# Patient Record
Sex: FEMALE | Race: WHITE | NOT HISPANIC OR LATINO | URBAN - NONMETROPOLITAN AREA
[De-identification: names, ages, dates, MRNs, and addresses within clinical notes are randomized per-mention and may not be internally consistent; named-entity substitution may affect disease eponyms.]

---

## 2020-02-05 ENCOUNTER — OFFICE VISIT (OUTPATIENT)
Dept: CARDIOLOGY | Facility: CLINIC | Age: 75
End: 2020-02-05

## 2020-02-05 VITALS
DIASTOLIC BLOOD PRESSURE: 76 MMHG | WEIGHT: 234 LBS | HEART RATE: 49 BPM | HEIGHT: 66 IN | SYSTOLIC BLOOD PRESSURE: 148 MMHG | BODY MASS INDEX: 37.61 KG/M2

## 2020-02-05 DIAGNOSIS — R00.1 BRADYCARDIA, SINUS: ICD-10-CM

## 2020-02-05 DIAGNOSIS — R05.9 COUGH: ICD-10-CM

## 2020-02-05 DIAGNOSIS — J43.9 PULMONARY EMPHYSEMA, UNSPECIFIED EMPHYSEMA TYPE (HCC): ICD-10-CM

## 2020-02-05 DIAGNOSIS — I10 ESSENTIAL HYPERTENSION: ICD-10-CM

## 2020-02-05 DIAGNOSIS — R07.89 CHEST PAIN, ATYPICAL: ICD-10-CM

## 2020-02-05 DIAGNOSIS — E03.9 HYPOTHYROIDISM, UNSPECIFIED TYPE: ICD-10-CM

## 2020-02-05 DIAGNOSIS — E55.9 VITAMIN D DEFICIENCY: ICD-10-CM

## 2020-02-05 DIAGNOSIS — E83.42 HYPOMAGNESEMIA: ICD-10-CM

## 2020-02-05 DIAGNOSIS — R60.0 BILATERAL LEG EDEMA: ICD-10-CM

## 2020-02-05 DIAGNOSIS — R41.3 MEMORY LOSS: ICD-10-CM

## 2020-02-05 DIAGNOSIS — E88.81 METABOLIC SYNDROME: ICD-10-CM

## 2020-02-05 DIAGNOSIS — D75.89 MACROCYTOSIS WITHOUT ANEMIA: ICD-10-CM

## 2020-02-05 DIAGNOSIS — R06.02 SHORTNESS OF BREATH: Primary | ICD-10-CM

## 2020-02-05 DIAGNOSIS — E78.00 HYPERCHOLESTEREMIA: ICD-10-CM

## 2020-02-05 DIAGNOSIS — E11.9 TYPE 2 DIABETES MELLITUS WITHOUT COMPLICATION, WITHOUT LONG-TERM CURRENT USE OF INSULIN (HCC): ICD-10-CM

## 2020-02-05 PROBLEM — E88.810 METABOLIC SYNDROME: Status: ACTIVE | Noted: 2020-02-05

## 2020-02-05 PROCEDURE — 93000 ELECTROCARDIOGRAM COMPLETE: CPT | Performed by: INTERNAL MEDICINE

## 2020-02-05 PROCEDURE — 99204 OFFICE O/P NEW MOD 45 MIN: CPT | Performed by: INTERNAL MEDICINE

## 2020-02-05 RX ORDER — LISINOPRIL 30 MG/1
30 TABLET ORAL DAILY
COMMUNITY
End: 2020-02-19 | Stop reason: ALTCHOICE

## 2020-02-05 RX ORDER — ASPIRIN 81 MG/1
81 TABLET ORAL DAILY
COMMUNITY

## 2020-02-05 RX ORDER — SPIRONOLACTONE 25 MG/1
25 TABLET ORAL DAILY
Qty: 30 TABLET | Refills: 11 | Status: SHIPPED | OUTPATIENT
Start: 2020-02-05 | End: 2022-08-23 | Stop reason: ALTCHOICE

## 2020-02-05 RX ORDER — QUETIAPINE FUMARATE 25 MG/1
25 TABLET, FILM COATED ORAL NIGHTLY
COMMUNITY

## 2020-02-05 RX ORDER — POTASSIUM CHLORIDE 750 MG/1
10 TABLET, FILM COATED, EXTENDED RELEASE ORAL DAILY
COMMUNITY
End: 2022-02-01

## 2020-02-05 RX ORDER — LACTOBACILLUS RHAMNOSUS GG 10B CELL
CAPSULE ORAL DAILY
COMMUNITY

## 2020-02-05 RX ORDER — METOPROLOL TARTRATE 50 MG/1
50 TABLET, FILM COATED ORAL
Status: SHIPPED | COMMUNITY
Start: 2020-02-05 | End: 2020-08-10 | Stop reason: ALTCHOICE

## 2020-02-05 RX ORDER — METOPROLOL TARTRATE 50 MG/1
50 TABLET, FILM COATED ORAL 2 TIMES DAILY
COMMUNITY
End: 2020-02-05

## 2020-02-05 RX ORDER — SIMVASTATIN 10 MG
10 TABLET ORAL NIGHTLY
COMMUNITY

## 2020-02-05 RX ORDER — FUROSEMIDE 20 MG/1
20 TABLET ORAL 2 TIMES DAILY
COMMUNITY
End: 2022-05-24 | Stop reason: SDUPTHER

## 2020-02-05 RX ORDER — FLUTICASONE PROPIONATE 50 MCG
2 SPRAY, SUSPENSION (ML) NASAL DAILY
COMMUNITY

## 2020-02-05 RX ORDER — ALBUTEROL SULFATE 2.5 MG/3ML
2.5 SOLUTION RESPIRATORY (INHALATION) 3 TIMES DAILY
COMMUNITY

## 2020-02-05 RX ORDER — ESCITALOPRAM OXALATE 20 MG/1
20 TABLET ORAL DAILY
COMMUNITY

## 2020-02-05 RX ORDER — SACCHAROMYCES BOULARDII 250 MG
250 CAPSULE ORAL 2 TIMES DAILY
COMMUNITY
End: 2022-05-04 | Stop reason: SDUPTHER

## 2020-02-05 RX ORDER — LEVOTHYROXINE SODIUM 0.07 MG/1
75 TABLET ORAL DAILY
COMMUNITY

## 2020-02-05 RX ORDER — CETIRIZINE HYDROCHLORIDE 10 MG/1
10 TABLET ORAL DAILY
COMMUNITY
End: 2022-02-01

## 2020-02-05 NOTE — PROGRESS NOTES
Chief Complaint   Patient presents with   • Establish Care     CHF   • Congestive Heart Failure     diagnosed at a recent visit to ER 1/30/20, went with edema in lower extremities, extremely SOB, abdomen distended.  Increased Lasix, stopped amlodipine, started neb txs. Followed up with Nate yesterday.  Symptoms started about a month ago, progressively gotten worse last week.    • Shortness of Breath     O2 dependent, Dr Aguilar follows her COPD, to see him Feb 17th.    • Labs     most recent labs are in chart, ER labs will be in door   • Cardiac history     thinks she had stress and echo a few years ago at Dr Lee's office, requesting records.         CARDIAC COMPLAINTS  chest pressure/discomfort, dyspnea, Dizziness, fatigue and lower extremity edema      Subjective   Lorena Wolf is a 74 y.o. female came in today for her initial cardiac evaluation.  She has history of hypertension, diabetes, hypothyroidism and hypercholesterolemia.  Her diabetes is being managed with diet alone.  She also has significant COPD and emphysema and is followed by Dr. Aguilar  She was seen in the emergency room about a week ago with increasing shortness of breath and bilateral leg edema with abdominal distention for about 2 to 3 weeks.  Apparently the hospital was full and they were not able to admit her so they gave her Lasix after which she started feeling little better she did undergo few investigation at the hospital which includes an x-ray chest which shows cardiomegaly.  Her lab work showed normal renal function, mildly elevated BNP, normal blood count with mild macrocytosis.  She was advised to go off the amlodipine and increase the dose of Lasix.  After this her leg edema did get little better she now has from her knee down.  She still has some shortness of breath.  She is not sure how much weight she lost.  She does have some chest pain in the form of sharp pain in the middle part of the chest lasting for few seconds to few  minutes and subsides on its own.  She also started noticing that her oxygen seems to be dropping a little bit and she is using it constantly.  She is due to see her pulmonologist in the next 2 weeks.  She apparently had an echocardiogram and a stress test done few years ago at another cardiologist office.  She is not sure why it was done.  She used to be a smoker who quit in 2003.  She has no exposure to secondhand smoking.  She does have a strong family history of premature coronary artery disease.    History reviewed. No pertinent surgical history.    Current Outpatient Medications   Medication Sig Dispense Refill   • albuterol (PROVENTIL) (2.5 MG/3ML) 0.083% nebulizer solution Take 2.5 mg by nebulization Every 4 (Four) Hours As Needed for Wheezing.     • aspirin 81 MG EC tablet Take 81 mg by mouth Daily.     • cetirizine (zyrTEC) 10 MG tablet Take 10 mg by mouth Daily.     • escitalopram (LEXAPRO) 20 MG tablet Take 20 mg by mouth Daily.     • fluticasone (FLONASE) 50 MCG/ACT nasal spray 2 sprays into the nostril(s) as directed by provider Daily.     • furosemide (LASIX) 20 MG tablet Take 20 mg by mouth 2 (Two) Times a Day.     • levothyroxine (SYNTHROID, LEVOTHROID) 75 MCG tablet Take 75 mcg by mouth Daily.     • lisinopril (PRINIVIL,ZESTRIL) 30 MG tablet Take 30 mg by mouth Daily.     • metoprolol tartrate (LOPRESSOR) 50 MG tablet Take 1 tablet by mouth. Reduce it to 1/2 Tab twice a day     • O2 (OXYGEN) Inhale 3 L/min Continuous.     • potassium chloride (K-DUR) 10 MEQ CR tablet Take 10 mEq by mouth Daily.     • probiotic (CULTURELLE) capsule capsule Take  by mouth Daily.     • QUEtiapine (SEROquel) 25 MG tablet Take 25 mg by mouth Every Night.     • saccharomyces boulardii (FLORASTOR) 250 MG capsule Take 250 mg by mouth 2 (Two) Times a Day.     • simvastatin (ZOCOR) 10 MG tablet Take 10 mg by mouth Every Night.     • spironolactone (ALDACTONE) 25 MG tablet Take 1 tablet by mouth Daily. 30 tablet 11     No  current facility-administered medications for this visit.            ALLERGIES:  Patient has no known allergies.    Past Medical History:   Diagnosis Date   • Chronic kidney disease     PCP reports as stage III   • Colon cancer (CMS/[facility])     s/p resection   • Constipation    • COPD (chronic obstructive pulmonary disease) (CMS/[facility])    • Depression    • Diabetes mellitus (CMS/[facility])    • GERD (gastroesophageal reflux disease)    • History of appendectomy    • History of bladder surgery    • History of eye surgery     bilateral   • History of foot surgery     bilateral   • History of hernia repair    • History of hysterectomy    • History of surgery on arm     left   • History of tubal ligation    • Hyperlipidemia    • Hypertension    • Hypothyroidism    • Osteoarthritis        Social History     Tobacco Use   Smoking Status Former Smoker   • Years: 40.00   • Types: Cigarettes   • Last attempt to quit:    • Years since quittin.1   Smokeless Tobacco Never Used          Family History   Problem Relation Age of Onset   • Cancer Mother    • Stroke Mother    • COPD Father    • Heart disease Sister    • Hypertension Sister    • Hyperlipidemia Sister    • Diabetes Sister    • Cancer Sister    • COPD Brother    • Other Sister         history unknown   • Other Brother          as a child   • Heart disease Son    • Hypertension Son    • Hyperlipidemia Son    • Hyperlipidemia Daughter    • Hypertension Daughter    • Hyperlipidemia Daughter    • No Known Problems Daughter    • No Known Problems Daughter    • No Known Problems Daughter        Review of Systems   Constitution: Positive for malaise/fatigue and weight gain. Negative for decreased appetite.   HENT: Negative for congestion and sore throat.    Eyes: Negative for blurred vision.   Cardiovascular: Positive for chest pain, dyspnea on exertion and leg swelling.   Respiratory: Positive for shortness of breath. Negative for snoring.    Endocrine: Negative for cold  "intolerance and heat intolerance.   Hematologic/Lymphatic: Negative for adenopathy. Does not bruise/bleed easily.   Skin: Negative for itching, nail changes and skin cancer.   Musculoskeletal: Negative for arthritis and myalgias.   Gastrointestinal: Negative for abdominal pain, dysphagia and heartburn.   Genitourinary: Negative for bladder incontinence and frequency.   Neurological: Positive for dizziness and light-headedness. Negative for seizures and vertigo.   Psychiatric/Behavioral: Positive for memory loss. Negative for altered mental status.   Allergic/Immunologic: Negative for environmental allergies and hives.       Diabetes- Yes  Thyroid- abnormal    Objective     /76 (BP Location: Right arm)   Pulse (!) 49   Ht 166.4 cm (65.5\")   Wt 106 kg (234 lb)   BMI 38.35 kg/m²     Physical Exam   Constitutional: She is oriented to person, place, and time. She appears well-developed and well-nourished.   HENT:   Head: Normocephalic.   Nose: Nose normal.   Eyes: Pupils are equal, round, and reactive to light. EOM are normal.   Neck: Normal range of motion.   Cardiovascular: Normal rate, regular rhythm, S1 normal and S2 normal.   Murmur heard.  Pulmonary/Chest: Effort normal and breath sounds normal.   Abdominal: Soft. Bowel sounds are normal.   Musculoskeletal: Normal range of motion. She exhibits edema.   Neurological: She is alert and oriented to person, place, and time.   Skin: Skin is warm and dry.   Psychiatric: She has a normal mood and affect.         ECG 12 Lead  Date/Time: 2/5/2020 11:05 AM  Performed by: Luis Casas MD  Authorized by: Luis Casas MD   Comparison: compared with previous ECG from 1/30/2020  Similar to previous ECG  Rhythm: sinus bradycardia  Rate: bradycardic  QRS axis: normal  Other findings: low voltage    Clinical impression: non-specific ECG              Assessment/Plan   Patient's Body mass index is 38.35 kg/m². BMI is above normal parameters. Recommendations " include: educational material, exercise counseling and nutrition counseling.     Lorena was seen today for establish care, congestive heart failure, shortness of breath, labs and cardiac history.    Diagnoses and all orders for this visit:    Shortness of breath  -     spironolactone (ALDACTONE) 25 MG tablet; Take 1 tablet by mouth Daily.  -     Adult Transthoracic Echo Complete W/ Cont if Necessary Per Protocol; Future  -     BNP; Future    Bilateral leg edema  -     D-dimer, Quantitative; Future  -     BNP; Future  -     US Venous Doppler Lower Extremity Left (duplex for insufficiency); Future    Hypothyroidism, unspecified type  -     TSH; Future    Metabolic syndrome    Chest pain, atypical  -     Stress Test With Myocardial Perfusion One Day; Future  -     High Sensitivity CRP; Future    Cough  -     Adult Transthoracic Echo Complete W/ Cont if Necessary Per Protocol; Future    Essential hypertension  -     spironolactone (ALDACTONE) 25 MG tablet; Take 1 tablet by mouth Daily.  -     Stress Test With Myocardial Perfusion One Day; Future  -     Comprehensive Metabolic Panel; Future    Type 2 diabetes mellitus without complication, without long-term current use of insulin (CMS/HCC)  -     Stress Test With Myocardial Perfusion One Day; Future  -     Hemoglobin A1c; Future    Pulmonary emphysema, unspecified emphysema type (CMS/HCC)    Hypomagnesemia  -     Magnesium; Future    Hypercholesteremia  -     Lipid Panel; Future    Bradycardia, sinus    Macrocytosis without anemia  -     Vitamin B12; Future  -     Folate; Future    Memory loss  -     Vitamin B12; Future  -     Folate; Future    Vitamin D deficiency  -     Vitamin D 25 Hydroxy; Future    At baseline she is bradycardic.  Her blood pressure is slightly elevated.  Her EKG shows sinus bradycardia, nonspecific ST changes.  Her QRS is around 97 ms.  Her clinical examination reveals a BMI of close to 38.  She does have soft heart sounds and short systolic murmur  at the mitral area.  Her lungs show slightly diminished air entry.  No rhonchi heard.  She does have about 2+ pedal edema and her peripheral pulses slightly weak.    Her major symptom seems to be the shortness of breath and pedal edema and has improved slightly with Lasix.  At this time I advised her to continue the same dose.  I do like to get an echocardiogram done to evaluate LV function, valvular structures as well as evaluate the PA pressure and rule out pericardial effusion.  She is also advised to undergo some lab work which includes a BNP, CMP and d-dimer.  She is also advised to have a venous Doppler ultrasound done to rule out any venous insufficiency.  At this time I advised her to start using support stockings.    Regarding her chest pain, it appears to be atypical but I cannot rule out cardiac especially with all the risk factors she has.  I advised her to undergo a stress test in the form of dobutamine Myoview to evaluate for any stress-induced arrhythmia, stress induced ischemia and also to evaluate her chronotropic response.   I also like to get her CRP level checked.    Regarding her blood sugar for which she has not been taking any medication, like to repeat the A1c.  If it is elevated she may need further work-up and also medication.    Regarding her hypercholesterolemia, continue the Zocor.  I like to recheck her cholesterol and also the LFT.    Regarding her macrocytosis and now she is having some memory problem, like to check her B12 and folic acid level    Regarding her bradycardia, I reduced the dose of Lopressor to 25 mg twice a day.    Regarding her blood pressure, I did add Aldactone 25 mg once a day and will check the labs to evaluate her potassium level    Regarding her metabolic syndrome, I had a long talk with her about diet exercise and weight reduction.    Based on the results of the test and her response to the medication, further recommendations will be made.                Electronically signed by Luis Casas MD February 5, 2020 11:02 AM

## 2020-02-05 NOTE — PATIENT INSTRUCTIONS
Mediterranean Diet  A Mediterranean diet refers to food and lifestyle choices that are based on the traditions of countries located on the [place]. This way of eating has been shown to help prevent certain conditions and improve outcomes for people who have chronic diseases, like kidney disease and heart disease.  What are tips for following this plan?  Lifestyle  · Cook and eat meals together with your family, when possible.  · Drink enough fluid to keep your urine clear or pale yellow.  · Be physically active every day. This includes:  ? Aerobic exercise like running or swimming.  ? Leisure activities like gardening, walking, or housework.  · Get 7-8 hours of sleep each night.  · If recommended by your health care provider, drink red wine in moderation. This means 1 glass a day for nonpregnant women and 2 glasses a day for men. A glass of wine equals 5 oz (150 mL).  Reading food labels    · Check the serving size of packaged foods. For foods such as rice and pasta, the serving size refers to the amount of cooked product, not dry.  · Check the total fat in packaged foods. Avoid foods that have saturated fat or trans fats.  · Check the ingredients list for added sugars, such as corn syrup.  Shopping  · At the grocery store, buy most of your food from the areas near the walls of the store. This includes:  ? Fresh fruits and vegetables (produce).  ? Grains, beans, nuts, and seeds. Some of these may be available in unpackaged forms or large amounts (in bulk).  ? Fresh seafood.  ? Poultry and eggs.  ? Low-fat dairy products.  · Buy whole ingredients instead of prepackaged foods.  · Buy fresh fruits and vegetables in-season from local farmers markets.  · Buy frozen fruits and vegetables in resealable bags.  · If you do not have access to quality fresh seafood, buy precooked frozen shrimp or canned fish, such as tuna, salmon, or sardines.  · Buy small amounts of raw or cooked vegetables, salads, or olives from  the deli or salad bar at your store.  · Stock your pantry so you always have certain foods on hand, such as olive oil, canned tuna, canned tomatoes, rice, pasta, and beans.  Cooking  · Cook foods with extra-virgin olive oil instead of using butter or other vegetable oils.  · Have meat as a side dish, and have vegetables or grains as your main dish. This means having meat in small portions or adding small amounts of meat to foods like pasta or stew.  · Use beans or vegetables instead of meat in common dishes like chili or lasagna.  · [place]ino with different cooking methods. Try roasting or broiling vegetables instead of steaming or sautéeing them.  · Add frozen vegetables to soups, stews, pasta, or rice.  · Add nuts or seeds for added healthy fat at each meal. You can add these to yogurt, salads, or vegetable dishes.  · Marinate fish or vegetables using olive oil, lemon juice, garlic, and fresh herbs.  Meal planning    · Plan to eat 1 vegetarian meal one day each week. Try to work up to 2 vegetarian meals, if possible.  · Eat seafood 2 or more times a week.  · Have healthy snacks readily available, such as:  ? Vegetable sticks with hummus.  ? Greek yogurt.  ? Fruit and nut trail mix.  · Eat balanced meals throughout the week. This includes:  ? Fruit: 2-3 servings a day  ? Vegetables: 4-5 servings a day  ? Low-fat dairy: 2 servings a day  ? Fish, poultry, or lean meat: 1 serving a day  ? Beans and legumes: 2 or more servings a week  ? Nuts and seeds: 1-2 servings a day  ? Whole grains: 6-8 servings a day  ? Extra-virgin olive oil: 3-4 servings a day  · Limit red meat and sweets to only a few servings a month  What are my food choices?  · Mediterranean diet  ? Recommended  ? Grains: Whole-grain pasta. Brown rice. Bulgar wheat. Polenta. Couscous. Whole-wheat bread. Oatmeal. Quinoa.  ? Vegetables: Artichokes. Beets. Broccoli. Cabbage. Carrots. Eggplant. Green beans. Chard. Kale. Spinach. Onions. Leeks. Peas. Squash.  Tomatoes. Peppers. Radishes.  ? Fruits: Apples. Apricots. Avocado. Berries. Bananas. Cherries. Dates. Figs. Grapes. Juan F. Melon. Oranges. Peaches. Plums. Pomegranate.  ? Meats and other protein foods: Beans. Almonds. Sunflower seeds. Pine nuts. Peanuts. Cod. [place]. Scallops. Shrimp. Tuna. Tilapia. Clams. Oysters. Eggs.  ? Dairy: Low-fat milk. Cheese. Greek yogurt.  ? Beverages: Water. Red wine. Herbal tea.  ? Fats and oils: Extra virgin olive oil. Avocado oil. Grape seed oil.  ? Sweets and desserts: Greek yogurt with honey. Baked apples. Poached pears. Trail mix.  ? Seasoning and other foods: Basil. Cilantro. Coriander. Cumin. Mint. Parsley. Sp. Rosemary. Tarragon. Garlic. Oregano. Thyme. Pepper. Balsalmic vinegar. Tahini. Hummus. Tomato sauce. Olives. Mushrooms.  ? Limit these  ? Grains: Prepackaged pasta or rice dishes. Prepackaged cereal with added sugar.  ? Vegetables: Deep fried potatoes (french fries).  ? Fruits: Fruit canned in syrup.  ? Meats and other protein foods: Beef. Pork. Lamb. Poultry with skin. Hot dogs. [place].  ? Dairy: Ice cream. Sour cream. Whole milk.  ? Beverages: Juice. Sugar-sweetened soft drinks. Beer. Liquor and spirits.  ? Fats and oils: Butter. Canola oil. Vegetable oil. Beef fat (tallow). Lard.  ? Sweets and desserts: Cookies. Cakes. Pies. Candy.  ? Seasoning and other foods: Mayonnaise. Premade sauces and marinades.  ? The items listed may not be a complete list. Talk with your dietitian about what dietary choices are right for you.  Summary  · The Mediterranean diet includes both food and lifestyle choices.  · Eat a variety of fresh fruits and vegetables, beans, nuts, seeds, and whole grains.  · Limit the amount of red meat and sweets that you eat.  · Talk with your health care provider about whether it is safe for you to drink red wine in moderation. This means 1 glass a day for nonpregnant women and 2 glasses a day for men. A glass of wine equals 5 oz (150 mL).  This information  is not intended to replace advice given to you by your health care provider. Make sure you discuss any questions you have with your health care provider.  Document Released: 08/10/2017 Document Revised: 09/12/2017 Document Reviewed: 08/10/2017  [employer] Interactive Patient Education © 2019 [employer]

## 2020-02-12 NOTE — PROGRESS NOTES
Pt aware of results and recommendations to try wearing stockings for 3 months and if no help to call back for a possible referral to Dr Mcleod.

## 2020-02-18 ENCOUNTER — HOSPITAL ENCOUNTER (OUTPATIENT)
Dept: CARDIOLOGY | Facility: HOSPITAL | Age: 75
Discharge: HOME | End: 2020-02-18

## 2020-02-18 ENCOUNTER — LAB (OUTPATIENT)
Dept: LAB | Facility: HOSPITAL | Age: 75
End: 2020-02-18

## 2020-02-18 VITALS — HEIGHT: 65 IN | BODY MASS INDEX: 38.93 KG/M2 | WEIGHT: 233.69 LBS

## 2020-02-18 DIAGNOSIS — E78.00 HYPERCHOLESTEREMIA: ICD-10-CM

## 2020-02-18 DIAGNOSIS — I10 ESSENTIAL HYPERTENSION: ICD-10-CM

## 2020-02-18 DIAGNOSIS — R60.0 BILATERAL LEG EDEMA: ICD-10-CM

## 2020-02-18 DIAGNOSIS — R06.02 SHORTNESS OF BREATH: ICD-10-CM

## 2020-02-18 DIAGNOSIS — E55.9 VITAMIN D DEFICIENCY: ICD-10-CM

## 2020-02-18 DIAGNOSIS — E83.42 HYPOMAGNESEMIA: ICD-10-CM

## 2020-02-18 DIAGNOSIS — D75.89 MACROCYTOSIS WITHOUT ANEMIA: ICD-10-CM

## 2020-02-18 DIAGNOSIS — R07.89 CHEST PAIN, ATYPICAL: ICD-10-CM

## 2020-02-18 DIAGNOSIS — R05.9 COUGH: ICD-10-CM

## 2020-02-18 DIAGNOSIS — E11.9 TYPE 2 DIABETES MELLITUS WITHOUT COMPLICATION, WITHOUT LONG-TERM CURRENT USE OF INSULIN (HCC): ICD-10-CM

## 2020-02-18 DIAGNOSIS — R41.3 MEMORY LOSS: ICD-10-CM

## 2020-02-18 DIAGNOSIS — E03.9 HYPOTHYROIDISM, UNSPECIFIED TYPE: ICD-10-CM

## 2020-02-18 LAB
ALBUMIN SERPL-MCNC: 4.08 G/DL (ref 3.5–5.2)
ALBUMIN/GLOB SERPL: 1 G/DL
ALP SERPL-CCNC: 84 U/L (ref 39–117)
ALT SERPL W P-5'-P-CCNC: 18 U/L (ref 1–33)
ANION GAP SERPL CALCULATED.3IONS-SCNC: 15 MMOL/L (ref 5–15)
AORTIC DIMENSIONLESS INDEX: 0.8 (DI)
AST SERPL-CCNC: 22 U/L (ref 1–32)
BH CV ECHO MEAS - AO MAX PG (FULL): 2.6 MMHG
BH CV ECHO MEAS - AO MAX PG: 6.6 MMHG
BH CV ECHO MEAS - AO MEAN PG (FULL): 1 MMHG
BH CV ECHO MEAS - AO MEAN PG: 3 MMHG
BH CV ECHO MEAS - AO ROOT AREA (BSA CORRECTED): 1.5
BH CV ECHO MEAS - AO ROOT AREA: 8 CM^2
BH CV ECHO MEAS - AO ROOT DIAM: 3.2 CM
BH CV ECHO MEAS - AO V2 MAX: 128 CM/SEC
BH CV ECHO MEAS - AO V2 MEAN: 81.8 CM/SEC
BH CV ECHO MEAS - AO V2 VTI: 33 CM
BH CV ECHO MEAS - AVA(I,A): 2 CM^2
BH CV ECHO MEAS - AVA(I,D): 2 CM^2
BH CV ECHO MEAS - AVA(V,A): 2.2 CM^2
BH CV ECHO MEAS - AVA(V,D): 2.2 CM^2
BH CV ECHO MEAS - BSA(HAYCOCK): 2.3 M^2
BH CV ECHO MEAS - BSA: 2.1 M^2
BH CV ECHO MEAS - BZI_BMI: 37.8 KILOGRAMS/M^2
BH CV ECHO MEAS - BZI_METRIC_HEIGHT: 167.6 CM
BH CV ECHO MEAS - BZI_METRIC_WEIGHT: 106.1 KG
BH CV ECHO MEAS - EDV(CUBED): 103.2 ML
BH CV ECHO MEAS - EDV(TEICH): 101.9 ML
BH CV ECHO MEAS - EF(CUBED): 72.2 %
BH CV ECHO MEAS - EF(TEICH): 63.9 %
BH CV ECHO MEAS - ESV(CUBED): 28.7 ML
BH CV ECHO MEAS - ESV(TEICH): 36.7 ML
BH CV ECHO MEAS - FS: 34.8 %
BH CV ECHO MEAS - IVS/LVPW: 1.1
BH CV ECHO MEAS - IVSD: 1.2 CM
BH CV ECHO MEAS - LA DIMENSION(2D): 3.9 CM
BH CV ECHO MEAS - LA DIMENSION: 4.1 CM
BH CV ECHO MEAS - LA/AO: 1.3
BH CV ECHO MEAS - LAT PEAK E' VEL: 7.1 CM/SEC
BH CV ECHO MEAS - LV IVRT: 0.07 SEC
BH CV ECHO MEAS - LV MASS(C)D: 206.3 GRAMS
BH CV ECHO MEAS - LV MASS(C)DI: 96.5 GRAMS/M^2
BH CV ECHO MEAS - LV MAX PG: 3.9 MMHG
BH CV ECHO MEAS - LV MEAN PG: 2 MMHG
BH CV ECHO MEAS - LV V1 MAX: 99.2 CM/SEC
BH CV ECHO MEAS - LV V1 MEAN: 55.4 CM/SEC
BH CV ECHO MEAS - LV V1 VTI: 22.7 CM
BH CV ECHO MEAS - LVIDD: 4.7 CM
BH CV ECHO MEAS - LVIDS: 3.1 CM
BH CV ECHO MEAS - LVOT AREA (M): 2.8 CM^2
BH CV ECHO MEAS - LVOT AREA: 2.8 CM^2
BH CV ECHO MEAS - LVOT DIAM: 1.9 CM
BH CV ECHO MEAS - LVPWD: 1.1 CM
BH CV ECHO MEAS - MED PEAK E' VEL: 8.5 CM/SEC
BH CV ECHO MEAS - MR MAX PG: 26.2 MMHG
BH CV ECHO MEAS - MR MAX VEL: 256 CM/SEC
BH CV ECHO MEAS - MV A MAX VEL: 101 CM/SEC
BH CV ECHO MEAS - MV DEC SLOPE: 430 CM/SEC^2
BH CV ECHO MEAS - MV DEC TIME: 0.2 SEC
BH CV ECHO MEAS - MV E MAX VEL: 93.3 CM/SEC
BH CV ECHO MEAS - MV E/A: 0.92
BH CV ECHO MEAS - MV MAX PG: 4.2 MMHG
BH CV ECHO MEAS - MV MEAN PG: 1 MMHG
BH CV ECHO MEAS - MV P1/2T MAX VEL: 105 CM/SEC
BH CV ECHO MEAS - MV P1/2T: 71.5 MSEC
BH CV ECHO MEAS - MV V2 MAX: 103 CM/SEC
BH CV ECHO MEAS - MV V2 MEAN: 50.9 CM/SEC
BH CV ECHO MEAS - MV V2 VTI: 29.1 CM
BH CV ECHO MEAS - MVA P1/2T LCG: 2.1 CM^2
BH CV ECHO MEAS - MVA(P1/2T): 3.1 CM^2
BH CV ECHO MEAS - MVA(VTI): 2.2 CM^2
BH CV ECHO MEAS - PA MAX PG (FULL): 1.8 MMHG
BH CV ECHO MEAS - PA MAX PG: 3.4 MMHG
BH CV ECHO MEAS - PA MEAN PG (FULL): 1 MMHG
BH CV ECHO MEAS - PA MEAN PG: 2 MMHG
BH CV ECHO MEAS - PA V2 MAX: 91.8 CM/SEC
BH CV ECHO MEAS - PA V2 MEAN: 60.3 CM/SEC
BH CV ECHO MEAS - PA V2 VTI: 22.7 CM
BH CV ECHO MEAS - RAP SYSTOLE: 10 MMHG
BH CV ECHO MEAS - RV MAX PG: 1.6 MMHG
BH CV ECHO MEAS - RV MEAN PG: 1 MMHG
BH CV ECHO MEAS - RV V1 MAX: 63.3 CM/SEC
BH CV ECHO MEAS - RV V1 MEAN: 40.3 CM/SEC
BH CV ECHO MEAS - RV V1 VTI: 14.8 CM
BH CV ECHO MEAS - RVDD: 2.8 CM
BH CV ECHO MEAS - RVSP: 29 MMHG
BH CV ECHO MEAS - SI(AO): 124.1 ML/M^2
BH CV ECHO MEAS - SI(CUBED): 34.8 ML/M^2
BH CV ECHO MEAS - SI(LVOT): 30.1 ML/M^2
BH CV ECHO MEAS - SI(TEICH): 30.5 ML/M^2
BH CV ECHO MEAS - SV(AO): 265.4 ML
BH CV ECHO MEAS - SV(CUBED): 74.5 ML
BH CV ECHO MEAS - SV(LVOT): 64.4 ML
BH CV ECHO MEAS - SV(TEICH): 65.1 ML
BH CV ECHO MEAS - TR MAX VEL: 215 CM/SEC
BH CV ECHO MEASUREMENTS AVERAGE E/E' RATIO: 11.96
BH CV NUCLEAR PRIOR STUDY: 3
BH CV STRESS BP STAGE 1: NORMAL
BH CV STRESS BP STAGE 2: NORMAL
BH CV STRESS BP STAGE 3: NORMAL
BH CV STRESS DOSE DOBUTAMINE STAGE 1: 10
BH CV STRESS DOSE DOBUTAMINE STAGE 2: 20
BH CV STRESS DOSE DOBUTAMINE STAGE 3: 30
BH CV STRESS DURATION MIN STAGE 1: 2
BH CV STRESS DURATION MIN STAGE 2: 2
BH CV STRESS DURATION MIN STAGE 3: 1
BH CV STRESS DURATION SEC STAGE 1: 0
BH CV STRESS DURATION SEC STAGE 2: 0
BH CV STRESS DURATION SEC STAGE 3: 58
BH CV STRESS HR STAGE 1: 109
BH CV STRESS HR STAGE 2: 112
BH CV STRESS HR STAGE 3: 123
BH CV STRESS PROTOCOL 1: NORMAL
BH CV STRESS RECOVERY BP: NORMAL MMHG
BH CV STRESS RECOVERY HR: 81 BPM
BH CV STRESS STAGE 1: 1
BH CV STRESS STAGE 2: 2
BH CV STRESS STAGE 3: 3
BILIRUB SERPL-MCNC: 0.4 MG/DL (ref 0.2–1.2)
BUN BLD-MCNC: 37 MG/DL (ref 8–23)
BUN/CREAT SERPL: 30.8 (ref 7–25)
CALCIUM SPEC-SCNC: 10 MG/DL (ref 8.6–10.5)
CHLORIDE SERPL-SCNC: 97 MMOL/L (ref 98–107)
CHOLEST SERPL-MCNC: 182 MG/DL (ref 0–200)
CO2 SERPL-SCNC: 26 MMOL/L (ref 22–29)
CREAT BLD-MCNC: 1.2 MG/DL (ref 0.57–1)
D DIMER PPP FEU-MCNC: 1.79 MCGFEU/ML (ref 0–0.5)
GFR SERPL CREATININE-BSD FRML MDRD: 44 ML/MIN/1.73
GLOBULIN UR ELPH-MCNC: 4.2 GM/DL
GLUCOSE BLD-MCNC: 110 MG/DL (ref 65–99)
HBA1C MFR BLD: 6.6 % (ref 4.8–5.6)
HDLC SERPL-MCNC: 54 MG/DL (ref 40–60)
LDLC SERPL CALC-MCNC: 85 MG/DL (ref 0–100)
LDLC/HDLC SERPL: 1.57 {RATIO}
LV EF NUC BP: 67 %
MAGNESIUM SERPL-MCNC: 2.2 MG/DL (ref 1.6–2.4)
MAXIMAL PREDICTED HEART RATE: 144 BPM
MAXIMAL PREDICTED HEART RATE: 144 BPM
NT-PROBNP SERPL-MCNC: 281.2 PG/ML (ref 5–1800)
PERCENT MAX PREDICTED HR: 85.42 %
POTASSIUM BLD-SCNC: 5.5 MMOL/L (ref 3.5–5.2)
PROT SERPL-MCNC: 8.3 G/DL (ref 6–8.5)
SODIUM BLD-SCNC: 138 MMOL/L (ref 136–145)
STRESS BASELINE BP: NORMAL MMHG
STRESS BASELINE HR: 58 BPM
STRESS PERCENT HR: 100 %
STRESS POST EXERCISE DUR MIN: 5 MIN
STRESS POST EXERCISE DUR SEC: 58 SEC
STRESS POST PEAK BP: NORMAL MMHG
STRESS POST PEAK HR: 123 BPM
STRESS TARGET HR: 122 BPM
STRESS TARGET HR: 122 BPM
TRIGL SERPL-MCNC: 217 MG/DL (ref 0–150)
TSH SERPL DL<=0.05 MIU/L-ACNC: 2.87 UIU/ML (ref 0.27–4.2)
VLDLC SERPL-MCNC: 43.4 MG/DL

## 2020-02-18 PROCEDURE — 93018 CV STRESS TEST I&R ONLY: CPT | Performed by: INTERNAL MEDICINE

## 2020-02-18 PROCEDURE — 83036 HEMOGLOBIN GLYCOSYLATED A1C: CPT | Performed by: INTERNAL MEDICINE

## 2020-02-18 PROCEDURE — A9500 TC99M SESTAMIBI: HCPCS | Performed by: INTERNAL MEDICINE

## 2020-02-18 PROCEDURE — 80053 COMPREHEN METABOLIC PANEL: CPT | Performed by: INTERNAL MEDICINE

## 2020-02-18 PROCEDURE — 78452 HT MUSCLE IMAGE SPECT MULT: CPT

## 2020-02-18 PROCEDURE — 93017 CV STRESS TEST TRACING ONLY: CPT

## 2020-02-18 PROCEDURE — 78452 HT MUSCLE IMAGE SPECT MULT: CPT | Performed by: INTERNAL MEDICINE

## 2020-02-18 PROCEDURE — 83735 ASSAY OF MAGNESIUM: CPT | Performed by: INTERNAL MEDICINE

## 2020-02-18 PROCEDURE — 82607 VITAMIN B-12: CPT | Performed by: INTERNAL MEDICINE

## 2020-02-18 PROCEDURE — 93306 TTE W/DOPPLER COMPLETE: CPT | Performed by: INTERNAL MEDICINE

## 2020-02-18 PROCEDURE — 83880 ASSAY OF NATRIURETIC PEPTIDE: CPT | Performed by: INTERNAL MEDICINE

## 2020-02-18 PROCEDURE — 93016 CV STRESS TEST SUPVJ ONLY: CPT | Performed by: NURSE PRACTITIONER

## 2020-02-18 PROCEDURE — 80061 LIPID PANEL: CPT | Performed by: INTERNAL MEDICINE

## 2020-02-18 PROCEDURE — 93306 TTE W/DOPPLER COMPLETE: CPT

## 2020-02-18 PROCEDURE — 85379 FIBRIN DEGRADATION QUANT: CPT | Performed by: INTERNAL MEDICINE

## 2020-02-18 PROCEDURE — 82746 ASSAY OF FOLIC ACID SERUM: CPT | Performed by: INTERNAL MEDICINE

## 2020-02-18 PROCEDURE — 25010000002 DOBUTAMINE PER 250 MG: Performed by: INTERNAL MEDICINE

## 2020-02-18 PROCEDURE — 0 TECHNETIUM SESTAMIBI: Performed by: INTERNAL MEDICINE

## 2020-02-18 PROCEDURE — 36415 COLL VENOUS BLD VENIPUNCTURE: CPT

## 2020-02-18 PROCEDURE — 82306 VITAMIN D 25 HYDROXY: CPT | Performed by: INTERNAL MEDICINE

## 2020-02-18 PROCEDURE — 86141 C-REACTIVE PROTEIN HS: CPT | Performed by: INTERNAL MEDICINE

## 2020-02-18 PROCEDURE — 84443 ASSAY THYROID STIM HORMONE: CPT | Performed by: INTERNAL MEDICINE

## 2020-02-18 RX ORDER — DOBUTAMINE HYDROCHLORIDE 200 MG/100ML
10 INJECTION INTRAVENOUS
Status: COMPLETED | OUTPATIENT
Start: 2020-02-18 | End: 2020-02-18

## 2020-02-18 RX ADMIN — TECHNETIUM TC 99M SESTAMIBI 1 DOSE: 1 INJECTION INTRAVENOUS at 09:45

## 2020-02-18 RX ADMIN — TECHNETIUM TC 99M SESTAMIBI 1 DOSE: 1 INJECTION INTRAVENOUS at 11:35

## 2020-02-18 RX ADMIN — DOBUTAMINE HYDROCHLORIDE 10 MCG/KG/MIN: 200 INJECTION INTRAVENOUS at 11:35

## 2020-02-19 ENCOUNTER — PRIOR AUTHORIZATION (OUTPATIENT)
Dept: CARDIOLOGY | Facility: CLINIC | Age: 75
End: 2020-02-19

## 2020-02-19 DIAGNOSIS — R06.02 SHORTNESS OF BREATH: Primary | ICD-10-CM

## 2020-02-19 LAB
25(OH)D3 SERPL-MCNC: 31.7 NG/ML (ref 30–100)
CRP SERPL-MCNC: 0.63 MG/DL (ref 0.01–0.5)
FOLATE SERPL-MCNC: >20 NG/ML (ref 4.78–24.2)
VIT B12 BLD-MCNC: 524 PG/ML (ref 211–946)

## 2020-02-19 NOTE — PROGRESS NOTES
Pt aware of results and recommendations to 1) stop Lisinopril and start Edarbi chlor 40/25 mg daily, 2) if VQ scan is negative we will be calling to schedule a cath. Understanding voiced.

## 2020-02-19 NOTE — TELEPHONE ENCOUNTER
----- Message from Luis Casas MD sent at 2/19/2020  6:11 AM EST -----  Change Meds.  If VQ is negative, need Cath

## 2020-02-19 NOTE — TELEPHONE ENCOUNTER
Pt and daughter aware of results of stress and recommendations to stop Lisinopril and start Edarbi chlor 40/25 mg daily. Advised that we will call to schedule cath if VQ scan is negative.             Pt is also on spironolactone, contraindication coming up.

## 2020-02-20 NOTE — TELEPHONE ENCOUNTER
Insurance is not going to approve Edarbi chlor (40/25 daily) until 1 more formulary drug has been tried and failed, these include Losartan/ HCT, Chlortahlidone, or Candesartan.

## 2020-02-21 RX ORDER — LOSARTAN POTASSIUM AND HYDROCHLOROTHIAZIDE 25; 100 MG/1; MG/1
1 TABLET ORAL DAILY
Qty: 90 TABLET | Refills: 3 | Status: SHIPPED | OUTPATIENT
Start: 2020-02-21 | End: 2020-08-10 | Stop reason: ALTCHOICE

## 2020-02-21 NOTE — TELEPHONE ENCOUNTER
Pt's daughter Awa aware that Edarbi chlor was not covered. Advised to start Hyzaar 100/25 mg daily.

## 2020-02-27 NOTE — PROGRESS NOTES
Pt's daughter Awa aware of results of VQ scan and recommendations to proceed with the cath. Aware that you will be calling to schedule her cath.

## 2020-02-28 DIAGNOSIS — R06.02 SHORTNESS OF BREATH: Primary | ICD-10-CM

## 2020-03-17 ENCOUNTER — OUTSIDE FACILITY SERVICE (OUTPATIENT)
Dept: CARDIOLOGY | Facility: CLINIC | Age: 75
End: 2020-03-17

## 2020-03-17 PROCEDURE — 93458 L HRT ARTERY/VENTRICLE ANGIO: CPT | Performed by: INTERNAL MEDICINE

## 2020-08-10 ENCOUNTER — OFFICE VISIT (OUTPATIENT)
Dept: CARDIOLOGY | Facility: CLINIC | Age: 75
End: 2020-08-10

## 2020-08-10 VITALS
WEIGHT: 238 LBS | HEIGHT: 65 IN | DIASTOLIC BLOOD PRESSURE: 82 MMHG | HEART RATE: 60 BPM | BODY MASS INDEX: 39.65 KG/M2 | TEMPERATURE: 97.3 F | SYSTOLIC BLOOD PRESSURE: 130 MMHG

## 2020-08-10 DIAGNOSIS — R60.0 EDEMA LEG: ICD-10-CM

## 2020-08-10 DIAGNOSIS — R00.2 PALPITATIONS: ICD-10-CM

## 2020-08-10 DIAGNOSIS — I10 ESSENTIAL HYPERTENSION: Primary | ICD-10-CM

## 2020-08-10 DIAGNOSIS — E11.9 TYPE 2 DIABETES MELLITUS WITHOUT COMPLICATION, WITHOUT LONG-TERM CURRENT USE OF INSULIN (HCC): ICD-10-CM

## 2020-08-10 DIAGNOSIS — E66.01 SEVERE OBESITY (BMI 35.0-39.9) WITH COMORBIDITY (HCC): ICD-10-CM

## 2020-08-10 DIAGNOSIS — R06.02 SHORTNESS OF BREATH: ICD-10-CM

## 2020-08-10 DIAGNOSIS — J43.8 OTHER EMPHYSEMA (HCC): ICD-10-CM

## 2020-08-10 DIAGNOSIS — E78.00 HYPERCHOLESTEREMIA: ICD-10-CM

## 2020-08-10 PROCEDURE — 99214 OFFICE O/P EST MOD 30 MIN: CPT | Performed by: NURSE PRACTITIONER

## 2020-08-10 RX ORDER — FERROUS SULFATE 325(65) MG
325 TABLET ORAL
COMMUNITY
End: 2022-08-23 | Stop reason: ALTCHOICE

## 2020-08-10 RX ORDER — SIMETHICONE 180 MG
180 CAPSULE ORAL EVERY 6 HOURS PRN
COMMUNITY

## 2020-08-10 RX ORDER — MULTIPLE VITAMINS W/ MINERALS TAB 9MG-400MCG
1 TAB ORAL DAILY
COMMUNITY
End: 2022-02-01

## 2020-08-10 RX ORDER — HYDROCHLOROTHIAZIDE 25 MG/1
25 TABLET ORAL DAILY
COMMUNITY
End: 2022-07-25

## 2020-08-10 RX ORDER — LOSARTAN POTASSIUM 100 MG/1
100 TABLET ORAL DAILY
COMMUNITY
End: 2022-05-24

## 2020-08-10 NOTE — PROGRESS NOTES
Chief Complaint   Patient presents with   • Hospital Follow-up     Patient had cardiac catheterization on 03/17/2020 without stenting to be managed medically.    • Aspirin     Patient is on aspirin.    • Shortness of Breath     States that she has been having shortness of breath for the last couple of days, did go to oncologist a couple of weeks ago and they told her that she was anemic, and that her kidney function was low and potassium was high. States that she feels fatigued a lot.   • Palpitations     States that she occasionally has palpitations, but states that they are not too often.   • Dizziness     Patient states that occasionally she has some dizziness when she stands up.        Cardiac Complaints  dyspnea and palpitations      Subjective   Lorena Wolf is a 75 y.o. female with hypertension, diabetes, hypothyroidism, edema, COPD, and hypercholesterolemia. Diabetes has been managed with diet alone.  COPD and emphysema followed by pulmonary.  She remains oxygen dependent and wears 2LNC daily.  She was referred in February 2020 for shortness of breath and edema. She had been to ER with bilateral leg edema and abdominal distention 2 to 3 weeks prior to consult.  A Her lab work showed normal renal function, mildly elevated BNP, normal blood count with mild macrocytosis.  She was advised to go off the amlodipine and increase the dose of Lasix.  Lasix was continued at consult. BNP was normal, D dimer slightly elevated, VQ scan was normal, venous duplex negative for DVT. PET scan also negative of chest, tongue was abnormal. Cardiac workup with stress and echo were recommended. Stress showed questionable anterior ischemia and normal LV function, cath was advised. Cath 3/17/2020 showed normal coronaries and normal LV function.    Patient returns today for follow up and denies any new concerns. She does have shortness of breath both with and without exertion. She remains oxygen dependent. When questioned, she  denies any worse than before. She does report more fatigue than prior and was told recently she was anemic. She will continue to follow with PCP in regards. Renal function noted as abnormal according to patient as well as elevated potassium noted. Palpitations and dizziness persists when active or rising too quickly, she reports this as improved. Labs remain followed by PCP as well as refills.             Cardiac History  Past Surgical History:   Procedure Laterality Date   • CARDIAC CATHETERIZATION  03/17/2020    Normal Coronaries   • CARDIOVASCULAR STRESS TEST  02/18/2020    D. Cardiolite- 85% THR. BP- 203/55. EF 67%. R/O Anterior Ischemia.   • ECHO - CONVERTED  02/18/2020    TLS. EF 65%. LA- 4.1 Cm. Mild MR. RVSP- 29 mmHg.   • OTHER SURGICAL HISTORY  02/07/2020    Venous doppler- No DVT. L> R Reflux       Current Outpatient Medications   Medication Sig Dispense Refill   • albuterol (PROVENTIL) (2.5 MG/3ML) 0.083% nebulizer solution Take 2.5 mg by nebulization Every 4 (Four) Hours As Needed for Wheezing.     • aspirin 81 MG EC tablet Take 81 mg by mouth Daily.     • cetirizine (zyrTEC) 10 MG tablet Take 10 mg by mouth Daily.     • escitalopram (LEXAPRO) 20 MG tablet Take 20 mg by mouth Daily.     • ferrous sulfate 325 (65 FE) MG tablet Take 325 mg by mouth Daily With Breakfast.     • fluticasone (FLONASE) 50 MCG/ACT nasal spray 2 sprays into the nostril(s) as directed by provider Daily.     • furosemide (LASIX) 20 MG tablet Take 20 mg by mouth Daily.     • hydroCHLOROthiazide (HYDRODIURIL) 25 MG tablet Take 25 mg by mouth Daily.     • levothyroxine (SYNTHROID, LEVOTHROID) 75 MCG tablet Take 75 mcg by mouth Daily.     • losartan (COZAAR) 100 MG tablet Take 100 mg by mouth Daily.     • metoprolol tartrate (LOPRESSOR) 25 MG tablet Take 25 mg by mouth 2 (Two) Times a Day.     • Multiple Vitamins-Minerals (MULTIVITAMIN WITH MINERALS) tablet tablet Take 1 tablet by mouth Daily.     • O2 (OXYGEN) Inhale 3 L/min  Continuous.     • potassium chloride (K-DUR) 10 MEQ CR tablet Take 10 mEq by mouth Daily.     • probiotic (CULTURELLE) capsule capsule Take  by mouth Daily.     • QUEtiapine (SEROquel) 25 MG tablet Take 25 mg by mouth Every Night.     • saccharomyces boulardii (FLORASTOR) 250 MG capsule Take 250 mg by mouth 2 (Two) Times a Day.     • simethicone (Phazyme) 180 MG capsule Take 180 mg by mouth Every 6 (Six) Hours As Needed for Flatulence.     • simvastatin (ZOCOR) 10 MG tablet Take 10 mg by mouth Every Night.     • spironolactone (ALDACTONE) 25 MG tablet Take 1 tablet by mouth Daily. 30 tablet 11     No current facility-administered medications for this visit.        Patient has no known allergies.    Past Medical History:   Diagnosis Date   • Chronic kidney disease     PCP reports as stage III   • Colon cancer (CMS/[facility])     s/p resection   • Constipation    • COPD (chronic obstructive pulmonary disease) (CMS/[facility])    • Depression    • Diabetes mellitus (CMS/[facility])    • GERD (gastroesophageal reflux disease)    • History of appendectomy    • History of bladder surgery    • History of eye surgery     bilateral   • History of foot surgery     bilateral   • History of hernia repair    • History of hysterectomy    • History of surgery on arm     left   • History of tubal ligation    • Hyperlipidemia    • Hypertension    • Hypothyroidism    • Osteoarthritis        Social History     Socioeconomic History   • Marital status:      Spouse name: Not on file   • Number of children: Not on file   • Years of education: Not on file   • Highest education level: Not on file   Tobacco Use   • Smoking status: Former Smoker     Years: 40.00     Types: Cigarettes     Last attempt to quit:      Years since quittin.6   • Smokeless tobacco: Never Used   Substance and Sexual Activity   • Alcohol use: Never     Frequency: Never   • Drug use: Never       Family History   Problem Relation Age of Onset   • Cancer Mother    • Stroke  "Mother    • COPD Father    • Heart disease Sister    • Hypertension Sister    • Hyperlipidemia Sister    • Diabetes Sister    • Cancer Sister    • COPD Brother    • Other Sister         history unknown   • Other Brother          as a child   • Heart disease Son    • Hypertension Son    • Hyperlipidemia Son    • Hyperlipidemia Daughter    • Hypertension Daughter    • Hyperlipidemia Daughter    • No Known Problems Daughter    • No Known Problems Daughter    • No Known Problems Daughter        Review of Systems   Constitution: Negative for malaise/fatigue and night sweats.   Cardiovascular: Positive for dyspnea on exertion and palpitations. Negative for chest pain, claudication, irregular heartbeat, leg swelling, near-syncope, orthopnea and syncope.   Respiratory: Positive for shortness of breath. Negative for cough and wheezing.    Musculoskeletal: Positive for joint pain and stiffness. Negative for back pain.   Gastrointestinal: Negative for anorexia, heartburn, melena, nausea and vomiting.   Genitourinary: Negative for dysuria, hematuria, hesitancy and nocturia.   Neurological: Negative for dizziness, light-headedness and loss of balance.   Psychiatric/Behavioral: Negative for depression and memory loss. The patient is not nervous/anxious.            Objective     /82 (BP Location: Left arm)   Pulse 60   Temp 97.3 °F (36.3 °C)   Ht 166 cm (65.35\")   Wt 108 kg (238 lb)   BMI 39.18 kg/m²     Physical Exam   Constitutional: She is oriented to person, place, and time. She appears well-developed and well-nourished.   HENT:   Head: Normocephalic and atraumatic.   Eyes: Pupils are equal, round, and reactive to light. EOM are normal.   Neck: Normal range of motion. Neck supple.   Cardiovascular: Normal rate and regular rhythm.   Murmur heard.  Pulmonary/Chest: Effort normal and breath sounds normal.   Abdominal: Soft.   Musculoskeletal: Normal range of motion.   Neurological: She is alert and oriented to " person, place, and time.   Skin: Skin is warm and dry.   Psychiatric: She has a normal mood and affect. Her behavior is normal.       Procedures    Assessment/Plan     HTN:  Blood pressure stable. No changes to current HCTZ, metoprolol, cozaar, or aldactone therapy advised. Limited sodium intake advised.    Edema:  On lasix therapy.  Tolerates well. Edema denied. Limited sodium advised.    Hyperlipidemia:  On statin therapy with zocor. Patient tolerates well. FLP followed by your office. For now, current will be continued.    Cardiac status:  On ASA therapy and tolerates well. Bleeding and bruising denied. Most recent cardiac cath showed normal coronaries and normal LV function.     COPD:  Remains on oxygen therapy 24/7 at 2LNC. Patient followed by Dr. Aguilar in regards.     BMI noted at 39.18, good cardiac diet with limited carbs, calories, and activity as tolerated advised.    6 month follow up recommended or sooner if needed.     No refills needed.           Problems Addressed this Visit        Cardiovascular and Mediastinum    Hypercholesteremia    Essential hypertension - Primary    Relevant Medications    losartan (COZAAR) 100 MG tablet    hydroCHLOROthiazide (HYDRODIURIL) 25 MG tablet    metoprolol tartrate (LOPRESSOR) 25 MG tablet       Respiratory    Shortness of breath    Pulmonary emphysema (CMS/HCC)       Endocrine    Type 2 diabetes mellitus without complication, without long-term current use of insulin (CMS/HCC)      Other Visit Diagnoses     Palpitations        Edema leg        Severe obesity (BMI 35.0-39.9) with comorbidity (CMS/HCC)              Patient's Body mass index is 39.18 kg/m². BMI is above normal parameters. Recommendations include: nutrition counseling.              Electronically signed by ARVIND Renae August 10, 2020 15:49

## 2021-05-18 RX ORDER — LOSARTAN POTASSIUM 100 MG/1
100 TABLET ORAL DAILY
Qty: 90 TABLET | Refills: 0 | OUTPATIENT
Start: 2021-05-18

## 2021-05-21 NOTE — TELEPHONE ENCOUNTER
"Patient made aware need to have redraw of potassium before can refill Losartan, patient verbalized understanding and states \"I know it is high my family doctor told me I needed to have it rechecked before now but I have just not went to have it done, I will go next week to my family doctor\".  "

## 2021-05-25 DIAGNOSIS — I10 ESSENTIAL HYPERTENSION: Primary | ICD-10-CM

## 2021-05-25 RX ORDER — LOSARTAN POTASSIUM 100 MG/1
100 TABLET ORAL DAILY
Qty: 30 TABLET | Refills: 0 | OUTPATIENT
Start: 2021-05-25

## 2021-05-25 NOTE — TELEPHONE ENCOUNTER
Pt made aware of provider recommendations.  Verbalizes understanding and acknowledges.  She will contact her PCP office today for further evaluation.

## 2021-05-26 ENCOUNTER — TELEPHONE (OUTPATIENT)
Dept: CARDIOLOGY | Facility: CLINIC | Age: 76
End: 2021-05-26

## 2021-05-26 NOTE — TELEPHONE ENCOUNTER
Pt called and left message, called her back. She was very persistent that we send in her script for her BP medication. Advised her that she was suppose to call PCP ( per Abbie, see telephone encounter) and see if he would send in her script for her BP medication if he has more recent labs. She agreed to call him.

## 2022-01-24 ENCOUNTER — PATIENT ROUNDING (BHMG ONLY) (OUTPATIENT)
Dept: CARDIOLOGY | Facility: CLINIC | Age: 77
End: 2022-01-24

## 2022-01-24 ENCOUNTER — LAB (OUTPATIENT)
Dept: LAB | Facility: HOSPITAL | Age: 77
End: 2022-01-24

## 2022-01-24 ENCOUNTER — OFFICE VISIT (OUTPATIENT)
Dept: CARDIOLOGY | Facility: CLINIC | Age: 77
End: 2022-01-24

## 2022-01-24 VITALS
OXYGEN SATURATION: 94 % | DIASTOLIC BLOOD PRESSURE: 72 MMHG | HEIGHT: 65 IN | HEART RATE: 70 BPM | SYSTOLIC BLOOD PRESSURE: 187 MMHG | WEIGHT: 231.2 LBS | BODY MASS INDEX: 38.52 KG/M2

## 2022-01-24 DIAGNOSIS — R60.0 BILATERAL LEG EDEMA: ICD-10-CM

## 2022-01-24 DIAGNOSIS — I10 ESSENTIAL HYPERTENSION: ICD-10-CM

## 2022-01-24 DIAGNOSIS — R07.89 CHEST PAIN, ATYPICAL: ICD-10-CM

## 2022-01-24 DIAGNOSIS — R00.2 PALPITATIONS: ICD-10-CM

## 2022-01-24 DIAGNOSIS — E78.00 HYPERCHOLESTEREMIA: ICD-10-CM

## 2022-01-24 DIAGNOSIS — I30.8 OTHER ACUTE PERICARDITIS: Primary | ICD-10-CM

## 2022-01-24 DIAGNOSIS — I50.9 OTHER CONGESTIVE HEART FAILURE: ICD-10-CM

## 2022-01-24 DIAGNOSIS — R00.1 BRADYCARDIA, SINUS: ICD-10-CM

## 2022-01-24 DIAGNOSIS — R06.02 SHORTNESS OF BREATH: ICD-10-CM

## 2022-01-24 LAB
BASOPHILS # BLD AUTO: 0.04 10*3/MM3 (ref 0–0.2)
BASOPHILS NFR BLD AUTO: 0.6 % (ref 0–1.5)
DEPRECATED RDW RBC AUTO: 48.4 FL (ref 37–54)
EOSINOPHIL # BLD AUTO: 0.24 10*3/MM3 (ref 0–0.4)
EOSINOPHIL NFR BLD AUTO: 3.6 % (ref 0.3–6.2)
ERYTHROCYTE [DISTWIDTH] IN BLOOD BY AUTOMATED COUNT: 13.6 % (ref 12.3–15.4)
HCT VFR BLD AUTO: 34.2 % (ref 34–46.6)
HGB BLD-MCNC: 10.6 G/DL (ref 12–15.9)
IMM GRANULOCYTES # BLD AUTO: 0.02 10*3/MM3 (ref 0–0.05)
IMM GRANULOCYTES NFR BLD AUTO: 0.3 % (ref 0–0.5)
LYMPHOCYTES # BLD AUTO: 1.38 10*3/MM3 (ref 0.7–3.1)
LYMPHOCYTES NFR BLD AUTO: 20.5 % (ref 19.6–45.3)
MCH RBC QN AUTO: 30.1 PG (ref 26.6–33)
MCHC RBC AUTO-ENTMCNC: 31 G/DL (ref 31.5–35.7)
MCV RBC AUTO: 97.2 FL (ref 79–97)
MONOCYTES # BLD AUTO: 0.54 10*3/MM3 (ref 0.1–0.9)
MONOCYTES NFR BLD AUTO: 8 % (ref 5–12)
NEUTROPHILS NFR BLD AUTO: 4.51 10*3/MM3 (ref 1.7–7)
NEUTROPHILS NFR BLD AUTO: 67 % (ref 42.7–76)
NRBC BLD AUTO-RTO: 0 /100 WBC (ref 0–0.2)
PLATELET # BLD AUTO: 212 10*3/MM3 (ref 140–450)
PMV BLD AUTO: 11 FL (ref 6–12)
RBC # BLD AUTO: 3.52 10*6/MM3 (ref 3.77–5.28)
WBC NRBC COR # BLD: 6.73 10*3/MM3 (ref 3.4–10.8)

## 2022-01-24 PROCEDURE — 99214 OFFICE O/P EST MOD 30 MIN: CPT | Performed by: INTERNAL MEDICINE

## 2022-01-24 PROCEDURE — 85025 COMPLETE CBC W/AUTO DIFF WBC: CPT

## 2022-01-24 PROCEDURE — 36415 COLL VENOUS BLD VENIPUNCTURE: CPT

## 2022-01-24 RX ORDER — MULTIVIT WITH MINERALS/LUTEIN
1000 TABLET ORAL DAILY
COMMUNITY
End: 2022-08-23 | Stop reason: ALTCHOICE

## 2022-01-24 RX ORDER — ZINC GLUCONATE 50 MG
TABLET ORAL DAILY
COMMUNITY
End: 2022-05-24

## 2022-01-24 NOTE — PROGRESS NOTES
January 24, 2022    Hello, may I speak with Lorena Wolf?    My name is EARLE SHEETS     [facility] am  with [facility]  [address] [place] [id number] [phone].    Before we get started may I verify your date of birth? 1945    I am calling to officially welcome you to our practice and ask about your recent visit. Is this a good time to talk? yes    Tell me about your visit with us. What things went well?  I REALLY ENJOYED TALKING TO HIM.  HE WAS GOOD ABOUT EXPLAINING THINGS TO ME IN A WAY THAT I COULD UNDERSTAND IT.  I WAS SURPRISED, I'D HEARD HE WAS A GOOD DR.       We're always looking for ways to make our patients' experiences even better. Do you have recommendations on ways we may improve?  yes.  THEY WERE BUSY AND I HAD TO WAIT.    Overall were you satisfied with your first visit to our practice? Yes.  I WAS REALLY PLEASED.       I appreciate you taking the time to speak with me today. Is there anything else I can do for you? no      Thank you, and have a great day.

## 2022-02-01 PROBLEM — R00.2 PALPITATIONS: Status: ACTIVE | Noted: 2022-02-01

## 2022-02-01 PROBLEM — I63.9 CVA (CEREBRAL VASCULAR ACCIDENT) (HCC): Status: ACTIVE | Noted: 2022-02-01

## 2022-02-24 ENCOUNTER — HOSPITAL ENCOUNTER (OUTPATIENT)
Dept: CARDIOLOGY | Facility: HOSPITAL | Age: 77
Discharge: HOME | End: 2022-02-24
Admitting: INTERNAL MEDICINE

## 2022-02-24 VITALS — BODY MASS INDEX: 38.57 KG/M2 | HEIGHT: 65 IN | WEIGHT: 231.48 LBS

## 2022-02-24 DIAGNOSIS — I50.9 OTHER CONGESTIVE HEART FAILURE: ICD-10-CM

## 2022-02-24 DIAGNOSIS — R00.2 PALPITATIONS: ICD-10-CM

## 2022-02-24 DIAGNOSIS — I30.8 OTHER ACUTE PERICARDITIS: ICD-10-CM

## 2022-02-24 DIAGNOSIS — I10 ESSENTIAL HYPERTENSION: ICD-10-CM

## 2022-02-24 PROCEDURE — 93306 TTE W/DOPPLER COMPLETE: CPT | Performed by: INTERNAL MEDICINE

## 2022-02-24 PROCEDURE — 93306 TTE W/DOPPLER COMPLETE: CPT

## 2022-03-02 LAB
AORTIC DIMENSIONLESS INDEX: 0.69 (DI)
BH CV ECHO MEAS - AO MAX PG (FULL): 4.2 MMHG
BH CV ECHO MEAS - AO MAX PG: 8.1 MMHG
BH CV ECHO MEAS - AO MEAN PG (FULL): 2 MMHG
BH CV ECHO MEAS - AO MEAN PG: 4 MMHG
BH CV ECHO MEAS - AO ROOT AREA (BSA CORRECTED): 1.3
BH CV ECHO MEAS - AO ROOT AREA: 6.2 CM^2
BH CV ECHO MEAS - AO ROOT DIAM: 2.8 CM
BH CV ECHO MEAS - AO V2 MAX: 142 CM/SEC
BH CV ECHO MEAS - AO V2 MEAN: 86.4 CM/SEC
BH CV ECHO MEAS - AO V2 VTI: 36.5 CM
BH CV ECHO MEAS - BSA(HAYCOCK): 2.2 M^2
BH CV ECHO MEAS - BSA: 2.1 M^2
BH CV ECHO MEAS - BZI_BMI: 38.4 KILOGRAMS/M^2
BH CV ECHO MEAS - BZI_METRIC_HEIGHT: 165.1 CM
BH CV ECHO MEAS - BZI_METRIC_WEIGHT: 104.8 KG
BH CV ECHO MEAS - EDV(CUBED): 96.7 ML
BH CV ECHO MEAS - EDV(TEICH): 96.8 ML
BH CV ECHO MEAS - EF(CUBED): 62.8 %
BH CV ECHO MEAS - EF(MOD-BP): 54 %
BH CV ECHO MEAS - EF(TEICH): 54.4 %
BH CV ECHO MEAS - ESV(CUBED): 35.9 ML
BH CV ECHO MEAS - ESV(TEICH): 44.1 ML
BH CV ECHO MEAS - FS: 28.1 %
BH CV ECHO MEAS - IVS/LVPW: 0.83
BH CV ECHO MEAS - IVSD: 0.84 CM
BH CV ECHO MEAS - LA DIMENSION(2D): 3.8 CM
BH CV ECHO MEAS - LA DIMENSION: 4.1 CM
BH CV ECHO MEAS - LA/AO: 1.5
BH CV ECHO MEAS - LAT PEAK E' VEL: 4.7 CM/SEC
BH CV ECHO MEAS - LV IVRT: 0.11 SEC
BH CV ECHO MEAS - LV MASS(C)D: 143.7 GRAMS
BH CV ECHO MEAS - LV MASS(C)DI: 68.3 GRAMS/M^2
BH CV ECHO MEAS - LV MAX PG: 3.9 MMHG
BH CV ECHO MEAS - LV MEAN PG: 2 MMHG
BH CV ECHO MEAS - LV V1 MAX: 98.6 CM/SEC
BH CV ECHO MEAS - LV V1 MEAN: 55.9 CM/SEC
BH CV ECHO MEAS - LV V1 VTI: 26 CM
BH CV ECHO MEAS - LVIDD: 4.6 CM
BH CV ECHO MEAS - LVIDS: 3.3 CM
BH CV ECHO MEAS - LVPWD: 1 CM
BH CV ECHO MEAS - MED PEAK E' VEL: 5.8 CM/SEC
BH CV ECHO MEAS - MR MAX PG: 118.8 MMHG
BH CV ECHO MEAS - MR MAX VEL: 545 CM/SEC
BH CV ECHO MEAS - MV A MAX VEL: 74.7 CM/SEC
BH CV ECHO MEAS - MV DEC SLOPE: 549 CM/SEC^2
BH CV ECHO MEAS - MV DEC TIME: 0.21 SEC
BH CV ECHO MEAS - MV E MAX VEL: 110 CM/SEC
BH CV ECHO MEAS - MV E/A: 1.5
BH CV ECHO MEAS - MV MAX PG: 8.2 MMHG
BH CV ECHO MEAS - MV MEAN PG: 2 MMHG
BH CV ECHO MEAS - MV P1/2T MAX VEL: 129 CM/SEC
BH CV ECHO MEAS - MV P1/2T: 68.8 MSEC
BH CV ECHO MEAS - MV V2 MAX: 143 CM/SEC
BH CV ECHO MEAS - MV V2 MEAN: 59.7 CM/SEC
BH CV ECHO MEAS - MV V2 VTI: 43.1 CM
BH CV ECHO MEAS - MVA P1/2T LCG: 1.7 CM^2
BH CV ECHO MEAS - MVA(P1/2T): 3.2 CM^2
BH CV ECHO MEAS - PA MAX PG (FULL): 4.5 MMHG
BH CV ECHO MEAS - PA MAX PG: 6.1 MMHG
BH CV ECHO MEAS - PA MEAN PG (FULL): 2 MMHG
BH CV ECHO MEAS - PA MEAN PG: 3 MMHG
BH CV ECHO MEAS - PA V2 MAX: 123 CM/SEC
BH CV ECHO MEAS - PA V2 MEAN: 83.7 CM/SEC
BH CV ECHO MEAS - PA V2 VTI: 31.7 CM
BH CV ECHO MEAS - RAP SYSTOLE: 10 MMHG
BH CV ECHO MEAS - RV MAX PG: 1.5 MMHG
BH CV ECHO MEAS - RV MEAN PG: 1 MMHG
BH CV ECHO MEAS - RV V1 MAX: 62 CM/SEC
BH CV ECHO MEAS - RV V1 MEAN: 41.6 CM/SEC
BH CV ECHO MEAS - RV V1 VTI: 17 CM
BH CV ECHO MEAS - RVDD: 3 CM
BH CV ECHO MEAS - RVSP: 15.3 MMHG
BH CV ECHO MEAS - SI(AO): 106.9 ML/M^2
BH CV ECHO MEAS - SI(CUBED): 28.9 ML/M^2
BH CV ECHO MEAS - SI(TEICH): 25.1 ML/M^2
BH CV ECHO MEAS - SV(AO): 224.8 ML
BH CV ECHO MEAS - SV(CUBED): 60.8 ML
BH CV ECHO MEAS - SV(TEICH): 52.7 ML
BH CV ECHO MEAS - TR MAX VEL: 115 CM/SEC
BH CV ECHO MEASUREMENTS AVERAGE E/E' RATIO: 20.95
IVRT: 108 MSEC
MAXIMAL PREDICTED HEART RATE: 142 BPM
STRESS TARGET HR: 121 BPM

## 2022-03-03 ENCOUNTER — TELEPHONE (OUTPATIENT)
Dept: CARDIOLOGY | Facility: CLINIC | Age: 77
End: 2022-03-03

## 2022-03-03 NOTE — TELEPHONE ENCOUNTER
Pt notified of no acute findings. Provider will discuss results at f/u. Pt reminded of appt date and time.    ----- Message from Benedict Vega MD sent at 3/3/2022  4:37 PM EST -----  Routine testing f/u.   ----- Message -----  From: Benedict Vega MD  Sent: 3/2/2022   9:54 PM EST  To: Benedict Vega MD

## 2022-05-04 ENCOUNTER — OFFICE VISIT (OUTPATIENT)
Dept: CARDIOLOGY | Facility: CLINIC | Age: 77
End: 2022-05-04

## 2022-05-04 VITALS
DIASTOLIC BLOOD PRESSURE: 61 MMHG | HEIGHT: 65 IN | WEIGHT: 236.6 LBS | SYSTOLIC BLOOD PRESSURE: 120 MMHG | HEART RATE: 64 BPM | BODY MASS INDEX: 39.42 KG/M2 | OXYGEN SATURATION: 96 %

## 2022-05-04 DIAGNOSIS — E78.00 HYPERCHOLESTEREMIA: ICD-10-CM

## 2022-05-04 DIAGNOSIS — R06.02 SHORTNESS OF BREATH: ICD-10-CM

## 2022-05-04 DIAGNOSIS — I63.9 CEREBROVASCULAR ACCIDENT (CVA), UNSPECIFIED MECHANISM: ICD-10-CM

## 2022-05-04 DIAGNOSIS — R00.2 PALPITATIONS: ICD-10-CM

## 2022-05-04 DIAGNOSIS — R07.89 CHEST PAIN, ATYPICAL: ICD-10-CM

## 2022-05-04 DIAGNOSIS — R60.0 BILATERAL LEG EDEMA: ICD-10-CM

## 2022-05-04 DIAGNOSIS — R00.1 BRADYCARDIA, SINUS: Primary | ICD-10-CM

## 2022-05-04 DIAGNOSIS — I10 ESSENTIAL HYPERTENSION: ICD-10-CM

## 2022-05-04 PROCEDURE — 99214 OFFICE O/P EST MOD 30 MIN: CPT | Performed by: INTERNAL MEDICINE

## 2022-05-04 RX ORDER — AMLODIPINE BESYLATE 5 MG/1
5 TABLET ORAL DAILY
COMMUNITY
Start: 2022-04-12 | End: 2022-05-24

## 2022-05-04 NOTE — PROGRESS NOTES
Subjective   Lorena Wolf is a 76 y.o. female     Chief Complaint   Patient presents with   • Follow-up     Here for testing f/u   • Hypertension   • Hyperlipidemia   • Palpitations   • Shortness of Breath   • Stroke       PROBLEM LIST:     1. Chest Pain  2. Shortness of breath  3. Palpitations  4. HTN  5. Hyperlipidemia  6. CVA in past  7. Colon CA, s/p resection in past  8. Anemia  9. COPD, on 02  10. DM  11. Chronic renal insuff.   12. GERD  13. Echo, 2-, mild LVH, EF 55-60%, 3D 54%, grade 2 DD, mild MR, physiolog. TR    Specialty Problems        Cardiology Problems    Bradycardia, sinus        Essential hypertension        Hypercholesteremia        CVA (cerebral vascular accident) (HCC)        Palpitations                HPI:  Ms. Wolf returns for follow-up on the above.    She is significantly improved from the time of her last visit.  She has markedly less dyspnea at rest and significantly less dyspnea when she is physically active.  She continues to describe 9 anginal chest and upper abdominal discomfort.  She denies orthopnea or PND.  She has had a mild exacerbation of lower extremity edema, has increased her Lasix, and swelling is nearly back to baseline.  The patient is minimally symptomatic with palpitations which have not changed over time and which are most compatible with sensed extrasystoles.    Blood pressures are currently well controlled.  It is unclear to me if this is because of better compliance as there was confusion with medications in the past.                        PRIOR MEDICATIONS    Current Outpatient Medications on File Prior to Visit   Medication Sig Dispense Refill   • albuterol (PROVENTIL) (2.5 MG/3ML) 0.083% nebulizer solution Take 2.5 mg by nebulization 3 (Three) Times a Day.     • amLODIPine (NORVASC) 5 MG tablet Take 5 mg by mouth Daily.     • aspirin 81 MG EC tablet Take 81 mg by mouth Daily.     • escitalopram (LEXAPRO) 20 MG tablet Take 20 mg by mouth Daily.     •  ferrous sulfate 325 (65 FE) MG tablet Take 325 mg by mouth Daily With Breakfast.     • fluticasone (FLONASE) 50 MCG/ACT nasal spray 2 sprays into the nostril(s) as directed by provider Daily.     • furosemide (LASIX) 20 MG tablet Take 20 mg by mouth 2 (Two) Times a Day.     • hydroCHLOROthiazide (HYDRODIURIL) 25 MG tablet Take 25 mg by mouth Daily.     • levothyroxine (SYNTHROID, LEVOTHROID) 75 MCG tablet Take 75 mcg by mouth Daily.     • losartan (COZAAR) 100 MG tablet Take 100 mg by mouth Daily.     • metoprolol tartrate (LOPRESSOR) 25 MG tablet Take 25 mg by mouth 2 (Two) Times a Day.     • O2 (OXYGEN) Inhale 2 L/min Continuous.     • probiotic (CULTURELLE) capsule capsule Take  by mouth Daily.     • QUEtiapine (SEROquel) 25 MG tablet Take 25 mg by mouth Every Night.     • simethicone (MYLICON,GAS-X) 180 MG capsule Take 180 mg by mouth Every 6 (Six) Hours As Needed for Flatulence.     • simvastatin (ZOCOR) 10 MG tablet Take 10 mg by mouth Every Night.     • spironolactone (ALDACTONE) 25 MG tablet Take 1 tablet by mouth Daily. 30 tablet 11   • vitamin C (ASCORBIC ACID) 250 MG tablet Take 1,000 mg by mouth Daily.     • Zinc 50 MG tablet Take  by mouth Daily.     • [DISCONTINUED] Bacillus Coagulans-Inulin (ALIGN PREBIOTIC-PROBIOTIC PO) Take  by mouth Daily.     • [DISCONTINUED] saccharomyces boulardii (FLORASTOR) 250 MG capsule Take 250 mg by mouth 2 (Two) Times a Day.       No current facility-administered medications on file prior to visit.       ALLERGIES:    Patient has no known allergies.    PAST MEDICAL HISTORY:    Past Medical History:   Diagnosis Date   • Chronic kidney disease     PCP reports as stage III   • Colon cancer (HCC)     s/p resection   • Constipation    • COPD (chronic obstructive pulmonary disease) (HCC)    • Depression    • Diabetes mellitus (HCC)    • GERD (gastroesophageal reflux disease)    • History of appendectomy    • History of bladder surgery    • History of eye surgery     bilateral    • History of foot surgery     bilateral   • History of hernia repair    • History of hysterectomy    • History of surgery on arm     left   • History of tubal ligation    • Hyperlipidemia    • Hypertension    • Hypothyroidism    • Osteoarthritis        SURGICAL HISTORY:    Past Surgical History:   Procedure Laterality Date   • CARDIAC CATHETERIZATION  2020    Normal Coronaries   • CARDIOVASCULAR STRESS TEST  2020    D. Cardiolite- 85% THR. BP- 203/55. EF 67%. R/O Anterior Ischemia.   • COLON RESECTION  2006   • ECHO - CONVERTED  2020    TLS. EF 65%. LA- 4.1 Cm. Mild MR. RVSP- 29 mmHg.   • EYE SURGERY     • HERNIA REPAIR  2006    umbilical   • HYSTERECTOMY  1996   • LARYNGOSCOPY     • OTHER SURGICAL HISTORY  2020    Venous doppler- No DVT. L> R Reflux   • TUBAL ABDOMINAL LIGATION  1969   • TUMOR REMOVAL      Left arm       SOCIAL HISTORY:    Social History     Socioeconomic History   • Marital status:    Tobacco Use   • Smoking status: Former Smoker     Years: 40.00     Types: Cigarettes     Quit date:      Years since quittin.3   • Smokeless tobacco: Never Used   Substance and Sexual Activity   • Alcohol use: Never   • Drug use: Never       FAMILY HISTORY:    Family History   Problem Relation Age of Onset   • Cancer Mother    • Stroke Mother    • COPD Father    • Heart disease Sister    • Hypertension Sister    • Hyperlipidemia Sister    • Diabetes Sister    • Cancer Sister    • COPD Brother    • Other Sister         history unknown   • Other Brother          as a child   • Heart disease Son    • Hypertension Son    • Hyperlipidemia Son    • Hyperlipidemia Daughter    • Hypertension Daughter    • Hyperlipidemia Daughter    • No Known Problems Daughter    • No Known Problems Daughter    • No Known Problems Daughter        Review of Systems   Constitutional: Positive for fatigue (occas. ).   HENT: Negative.    Eyes: Positive for visual disturbance  "(glasses prn).   Respiratory: Positive for shortness of breath (wears 02).    Cardiovascular: Positive for palpitations (usual) and leg swelling. Negative for chest pain.   Gastrointestinal: Negative.    Endocrine: Negative.    Genitourinary: Negative.    Musculoskeletal: Positive for arthralgias, gait problem (ambulates with rolling walker) and myalgias.   Skin: Negative.    Allergic/Immunologic: Positive for environmental allergies.   Neurological: Negative for dizziness, syncope and light-headedness.   Hematological: Negative.    Psychiatric/Behavioral: Negative.        VISIT VITALS:  Vitals:    05/04/22 1120   BP: 120/61   BP Location: Left arm   Patient Position: Sitting   Pulse: 64   SpO2: 96%   Weight: 107 kg (236 lb 9.6 oz)   Height: 165 cm (64.96\")      /61 (BP Location: Left arm, Patient Position: Sitting)   Pulse 64   Ht 165 cm (64.96\")   Wt 107 kg (236 lb 9.6 oz)   SpO2 96%   BMI 39.42 kg/m²     RECENT LABS:    Objective       Physical Exam  Vitals and nursing note reviewed.   Constitutional:       General: She is not in acute distress.     Appearance: She is well-developed.   HENT:      Head: Normocephalic and atraumatic.   Eyes:      Conjunctiva/sclera: Conjunctivae normal.      Pupils: Pupils are equal, round, and reactive to light.   Neck:      Vascular: No carotid bruit, hepatojugular reflux or JVD.      Trachea: No tracheal deviation.      Comments: Nl. Carotid upstrokes  Cardiovascular:      Rate and Rhythm: Normal rate and regular rhythm.      Pulses:           Radial pulses are 2+ on the right side and 2+ on the left side.      Heart sounds: Murmur heard.    Systolic murmur is present.    No friction rub. No S3 or S4 sounds.      Comments: S1 decreased  2/6 systolic ejection murmur RUSB  2-3/6 systolic ejection murmur  LUSB  No MR  No TR  Pulmonary:      Effort: Pulmonary effort is normal.      Breath sounds: Normal breath sounds. No wheezing, rhonchi or rales.      Comments: Nl. " Expir. Phase  Nl. Breath sound intensity  Bronchial breath sounds in left base  Abdominal:      General: Bowel sounds are normal. There is no distension or abdominal bruit.      Palpations: Abdomen is soft. There is no mass.      Tenderness: There is no abdominal tenderness. There is no guarding or rebound.      Comments: No organomegaly   Musculoskeletal:         General: No tenderness or deformity. Normal range of motion.      Cervical back: Normal range of motion and neck supple.      Right lower leg: Edema present.      Left lower leg: Edema present.      Comments: LLE, 2+ edema, palpable pedal pulses  RLE, 1+ edema, palpable pedal pulses   Skin:     General: Skin is warm and dry.      Coloration: Skin is not pale.      Findings: No erythema or rash.   Neurological:      Mental Status: She is alert and oriented to person, place, and time.   Psychiatric:         Behavior: Behavior normal.         Thought Content: Thought content normal.         Judgment: Judgment normal.         Procedures      Assessment/Plan   #1.  Chest pain.  The patient describes nonanginal chest discomfort and had no angiographically apparent epicardial coronary artery disease at cath in February 2020.  No further evaluation or treatment change is indicated at this time.    2.  Previously poorly controlled blood pressures.  Blood pressures are now at goal.  We will continue current therapy.    3.  Severe exertional and rest dyspnea which has improved rather dramatically since the patient's last visit.  Because of the symptoms and because of pericardial calcium noted on imaging studies we performed echocardiography to assess for constrictive or constrictive effusive physiology.  This was a very limited study but did not seem to support impaired left ventricular filling or increased intraventricular interdependence.  As the patient is improved we will not pursue further evaluation in that regard.    4.  Palpitations.  I will investigate to  see if the patient has had event monitoring in the past.  If not it might be reasonable to perform event monitor to ensure the patient is not experiencing paroxysmal atrial fibrillation which would require anticoagulation for stroke prophylaxis.    5.  Ms. Titus will follow with Nate Hanley as instructed we will plan on seeing her in follow-up in 6 to 12 months or on appearing basis as discussed.   Diagnosis Plan   1. Bradycardia, sinus     2. Essential hypertension     3. Hypercholesteremia     4. Palpitations     5. Cerebrovascular accident (CVA), unspecified mechanism (HCC)     6. Shortness of breath     7. Bilateral leg edema     8. Chest pain, atypical         No follow-ups on file.         Advance Care Planning   ACP discussion was held with the patient during this visit. Patient does not have an advance directive, information provided.             Class 2 Severe Obesity (BMI >=35 and <=39.9). Obesity-related health conditions include the following: hypertension, diabetes mellitus, dyslipidemias, GERD and CVA, colon CA, COPD. Obesity is to be assessed at today's visit. BMI is pcp addressing. We discussed portion control and increasing exercise.             Electronically signed by:    Scribed for Benedict Vega MD by Deepti De Leon LPN on May 4, 2022  at 11:27 EDT    I, Benedict Vega MD personally performed the services described in this documentation as scribed by the above named individual in my presence, and it is both accurate and complete. May 4, 2022 11:27 EDT      This note is dictated utilizing voice recognition software.  Although this record has been proof read, transcriptional errors may still be present. If questions occur regarding the content of this record please do not hesitate to call our office.

## 2022-05-24 ENCOUNTER — OFFICE VISIT (OUTPATIENT)
Dept: CARDIOLOGY | Facility: CLINIC | Age: 77
End: 2022-05-24

## 2022-05-24 VITALS
SYSTOLIC BLOOD PRESSURE: 115 MMHG | DIASTOLIC BLOOD PRESSURE: 59 MMHG | WEIGHT: 237 LBS | OXYGEN SATURATION: 91 % | HEIGHT: 65 IN | HEART RATE: 64 BPM | BODY MASS INDEX: 39.49 KG/M2

## 2022-05-24 DIAGNOSIS — R60.0 BILATERAL LEG EDEMA: Primary | ICD-10-CM

## 2022-05-24 DIAGNOSIS — I10 ESSENTIAL HYPERTENSION: ICD-10-CM

## 2022-05-24 DIAGNOSIS — R06.02 SHORTNESS OF BREATH: ICD-10-CM

## 2022-05-24 PROCEDURE — 99214 OFFICE O/P EST MOD 30 MIN: CPT | Performed by: PHYSICIAN ASSISTANT

## 2022-05-24 PROCEDURE — 93000 ELECTROCARDIOGRAM COMPLETE: CPT | Performed by: PHYSICIAN ASSISTANT

## 2022-05-24 RX ORDER — FUROSEMIDE 40 MG/1
40 TABLET ORAL DAILY
Qty: 30 TABLET | Refills: 5 | Status: SHIPPED | OUTPATIENT
Start: 2022-05-24

## 2022-05-24 RX ORDER — CIPROFLOXACIN 500 MG/1
500 TABLET, FILM COATED ORAL 2 TIMES DAILY
Qty: 14 TABLET | Refills: 0 | Status: SHIPPED | OUTPATIENT
Start: 2022-05-24 | End: 2022-08-23 | Stop reason: ALTCHOICE

## 2022-05-24 NOTE — PROGRESS NOTES
Problem list     Subjective   Lorena Wolf is a 77 y.o. female     Chief Complaint   Patient presents with   • Edema     PROBLEM LIST:      1.  Nonobstructive disease/normal coronaries by catheterization in 2020  2. Shortness of breath  3. Palpitations  4. HTN  5. Hyperlipidemia  6. CVA in past  7. Colon CA, s/p resection in past  8. Anemia  9. COPD, on 02  10. DM  11. Chronic renal insuff.   12. GERD  13. Echo, 2-, mild LVH, EF 55-60%, 3D 54%, grade 2 DD, mild MR, physiolog. TR    HPI    Patient is a 77-year-old female who presents to the office for evaluation.  Patient has done well recently with exception of lower extremity edema.  She has noted that and has significant varicosities and venous insufficiency.  She also has diastolic dysfunction and possible diastolic heart failure    She has been on hydrochlorothiazide 25 mg once daily, spironolactone once daily as well as Lasix 20 mg.    She has no chest pain or pressure.  She had catheterization performed which demonstrated no significant epicardial coronary disease at that time    She has shortness of breath that is moderate at best with her chronic lung disease on oxygen continuously.  She does not describe PND orthopnea.    Her edema continues to fluctuate at times.  Her blood pressure, as of recent, has been running on the lower side.  Otherwise she is stable        Current Outpatient Medications on File Prior to Visit   Medication Sig Dispense Refill   • albuterol (PROVENTIL) (2.5 MG/3ML) 0.083% nebulizer solution Take 2.5 mg by nebulization 3 (Three) Times a Day.     • aspirin 81 MG EC tablet Take 81 mg by mouth Daily.     • escitalopram (LEXAPRO) 20 MG tablet Take 20 mg by mouth Daily.     • ferrous sulfate 325 (65 FE) MG tablet Take 325 mg by mouth Daily With Breakfast.     • fluticasone (FLONASE) 50 MCG/ACT nasal spray 2 sprays into the nostril(s) as directed by provider Daily.     • hydroCHLOROthiazide (HYDRODIURIL) 25 MG tablet Take 25 mg by  mouth Daily.     • levothyroxine (SYNTHROID, LEVOTHROID) 75 MCG tablet Take 75 mcg by mouth Daily.     • metoprolol tartrate (LOPRESSOR) 25 MG tablet Take 25 mg by mouth 2 (Two) Times a Day.     • O2 (OXYGEN) Inhale 2 L/min Continuous.     • probiotic (CULTURELLE) capsule capsule Take  by mouth Daily.     • QUEtiapine (SEROquel) 25 MG tablet Take 25 mg by mouth Every Night.     • simethicone (MYLICON,GAS-X) 180 MG capsule Take 180 mg by mouth Every 6 (Six) Hours As Needed for Flatulence.     • simvastatin (ZOCOR) 10 MG tablet Take 10 mg by mouth Every Night.     • spironolactone (ALDACTONE) 25 MG tablet Take 1 tablet by mouth Daily. 30 tablet 11   • vitamin C (ASCORBIC ACID) 250 MG tablet Take 1,000 mg by mouth Daily.     • [DISCONTINUED] amLODIPine (NORVASC) 5 MG tablet Take 5 mg by mouth Daily.     • [DISCONTINUED] furosemide (LASIX) 20 MG tablet Take 20 mg by mouth 2 (Two) Times a Day.     • [DISCONTINUED] losartan (COZAAR) 100 MG tablet Take 100 mg by mouth Daily.     • [DISCONTINUED] Zinc 50 MG tablet Take  by mouth Daily.       No current facility-administered medications on file prior to visit.       Patient has no known allergies.    Past Medical History:   Diagnosis Date   • Chronic kidney disease     PCP reports as stage III   • Colon cancer (HCC)     s/p resection   • Constipation    • COPD (chronic obstructive pulmonary disease) ([facility])    • Depression    • Diabetes mellitus (HCC)    • GERD (gastroesophageal reflux disease)    • History of appendectomy    • History of bladder surgery    • History of eye surgery     bilateral   • History of foot surgery     bilateral   • History of hernia repair    • History of hysterectomy    • History of surgery on arm     left   • History of tubal ligation    • Hyperlipidemia    • Hypertension    • Hypothyroidism    • Osteoarthritis        Social History     Socioeconomic History   • Marital status:    Tobacco Use   • Smoking status: Former Smoker     Years: 40.00  "    Types: Cigarettes     Quit date:      Years since quittin.4   • Smokeless tobacco: Never Used   Substance and Sexual Activity   • Alcohol use: Never   • Drug use: Never       Family History   Problem Relation Age of Onset   • Cancer Mother    • Stroke Mother    • COPD Father    • Heart disease Sister    • Hypertension Sister    • Hyperlipidemia Sister    • Diabetes Sister    • Cancer Sister    • COPD Brother    • Other Sister         history unknown   • Other Brother          as a child   • Heart disease Son    • Hypertension Son    • Hyperlipidemia Son    • Hyperlipidemia Daughter    • Hypertension Daughter    • Hyperlipidemia Daughter    • No Known Problems Daughter    • No Known Problems Daughter    • No Known Problems Daughter        Review of Systems   Constitutional: Negative.  Negative for chills and fever.   HENT: Negative.  Negative for congestion and sinus pressure.    Respiratory: Positive for shortness of breath (at times, uses oxygen). Negative for chest tightness.    Cardiovascular: Positive for palpitations (occasionally races) and leg swelling. Negative for chest pain.   Gastrointestinal: Positive for nausea (at times). Negative for abdominal pain, blood in stool, constipation, diarrhea and vomiting.   Endocrine: Negative.  Negative for cold intolerance and heat intolerance.   Genitourinary: Positive for urgency. Negative for dysuria, frequency and hematuria.   Musculoskeletal: Positive for gait problem.   Neurological: Positive for dizziness (occasionally). Negative for syncope and light-headedness.   Psychiatric/Behavioral: Positive for sleep disturbance (oxygen denies waking with soa or cp).       Objective   Vitals:    22 1100   BP: 115/59   BP Location: Left arm   Patient Position: Sitting   Pulse: 64   SpO2: 91%   Weight: 108 kg (237 lb)   Height: 165.1 cm (65\")      /59 (BP Location: Left arm, Patient Position: Sitting)   Pulse 64   Ht 165.1 cm (65\")   Wt 108 kg " (237 lb)   SpO2 91% Comment: O2 @ 2 lpm  BMI 39.44 kg/m²     Lab Results (most recent)     None          Physical Exam  Vitals and nursing note reviewed.   Constitutional:       General: She is not in acute distress.     Appearance: Normal appearance. She is well-developed.   HENT:      Head: Normocephalic and atraumatic.   Eyes:      General: No scleral icterus.        Right eye: No discharge.         Left eye: No discharge.      Conjunctiva/sclera: Conjunctivae normal.   Neck:      Vascular: No carotid bruit.   Cardiovascular:      Rate and Rhythm: Normal rate and regular rhythm.      Heart sounds: Normal heart sounds. No murmur heard.    No friction rub. No gallop.      Comments: Grade 2/6 systolic murmur right upper sternal border.  Grade 2/6 systolic murmur left upper sternal border    Varicosities noted in the lower extremities  Pulmonary:      Effort: Pulmonary effort is normal. No respiratory distress.      Breath sounds: Normal breath sounds. No wheezing or rales.   Chest:      Chest wall: No tenderness.   Musculoskeletal:      Right lower le+ Edema present.      Left lower le+ Edema present.   Skin:     General: Skin is warm and dry.      Coloration: Skin is not pale.      Findings: No erythema or rash.   Neurological:      Mental Status: She is alert and oriented to person, place, and time.      Cranial Nerves: No cranial nerve deficit.   Psychiatric:         Behavior: Behavior normal.         Procedure     ECG 12 Lead    Date/Time: 2022 11:07 AM  Performed by: Mikie Del Real PA  Authorized by: Mikie Del Real PA   Comparison: compared with previous ECG from 2020  Comments: EKG demonstrates sinus bradycardia 57 bpm with incomplete right bundle branch block, septal wall MI age-indeterminate no acute ST changes               Assessment & Plan     Problems Addressed this Visit        Cardiac and Vasculature    Essential hypertension    Relevant Medications    furosemide (LASIX) 40  MG tablet    Other Relevant Orders    Basic Metabolic Panel    proBNP    Magnesium       Pulmonary and Pneumonias    Shortness of breath    Relevant Orders    Basic Metabolic Panel    proBNP    Magnesium       Symptoms and Signs    Bilateral leg edema - Primary    Relevant Orders    Basic Metabolic Panel    proBNP    Magnesium      Diagnoses       Codes Comments    Bilateral leg edema    -  Primary ICD-10-CM: R60.0  ICD-9-CM: 782.3     Shortness of breath     ICD-10-CM: R06.02  ICD-9-CM: 786.05     Essential hypertension     ICD-10-CM: I10  ICD-9-CM: 401.9         Recommendation  1.  Patient is a 77-year-old female who presents to the office for evaluation.  She has chronic lower extremity edema.  She also has had a degree of hypotension as of recent.  I would like to stop amlodipine which is likely exacerbating her edema.  I will increase Lasix to 40 mg but I am concerned that she is taking hydrochlorothiazide and spironolactone at the same time.  I want to check labs in 1 week to ensure no azotemia or electrolyte abnormality as discussed    2.  Regards to her dyspnea, is likely related to patient's underlying lung disease.  Very trivial valvular disease noted on echocardiogram.    3.  Otherwise we will make no changes and see her back for follow-up in a few months and recommend further.  She is to follow-up with primary as scheduled           Patient brought in medicine list to appointment, it's been reviewed with patient and med list was updated in the chart.     Advance Care Planning   ACP discussion was held with the patient during this visit. Patient does not have an advance directive, declines further assistance.         Electronically signed by:

## 2022-07-05 ENCOUNTER — TELEPHONE (OUTPATIENT)
Dept: CARDIOLOGY | Facility: CLINIC | Age: 77
End: 2022-07-05

## 2022-07-05 NOTE — TELEPHONE ENCOUNTER
The PM called to verify if the pt had her labs performed that Mikie ordered on 5/24/22.  Pt states she hasn't had the performed yet.  The PM urged the pt to have these perform this week.  Lab requisition faxed to [facility] lab.

## 2022-07-13 ENCOUNTER — TELEPHONE (OUTPATIENT)
Dept: CARDIOLOGY | Facility: CLINIC | Age: 77
End: 2022-07-13

## 2022-07-13 NOTE — TELEPHONE ENCOUNTER
Patient called wanted to clarify if she was to have a urine test ordered also.    Per Mikie Del Real,PAC  I ordered Bmp. Patient has been notified and verbally understands.

## 2022-07-14 ENCOUNTER — TELEPHONE (OUTPATIENT)
Dept: CARDIOLOGY | Facility: CLINIC | Age: 77
End: 2022-07-14

## 2022-07-14 DIAGNOSIS — I10 ESSENTIAL HYPERTENSION: ICD-10-CM

## 2022-07-14 DIAGNOSIS — R06.02 SHORTNESS OF BREATH: Primary | ICD-10-CM

## 2022-07-14 NOTE — TELEPHONE ENCOUNTER
Patient informed and will repeat BMP in one week.     ----- Message from DANNIE Clarke sent at 7/14/2022  3:59 PM EDT -----  Have patient stop taking her hydrochlorothiazide.  I want her to check labs in 1 week.  Recheck a BMP in 1 week

## 2022-07-21 ENCOUNTER — TELEPHONE (OUTPATIENT)
Dept: CARDIOLOGY | Facility: CLINIC | Age: 77
End: 2022-07-21

## 2022-07-21 NOTE — TELEPHONE ENCOUNTER
Patients daughter Georgina called and would like patients labs faxed to [facility]. Sent.    HENRY MO MA

## 2022-07-25 ENCOUNTER — TELEPHONE (OUTPATIENT)
Dept: CARDIOLOGY | Facility: CLINIC | Age: 77
End: 2022-07-25

## 2022-07-25 NOTE — TELEPHONE ENCOUNTER
Patient's daughter, Georgina, called for lab results and wanting to see if her mother could start back on the Lasix. Chart shows she was to hold HCTZ until labs were completed. She is unsure if patient is still taking HCTZ and Spironolactone. She will call back with medications once she finds out what her mother is taking.     Georgina called to confirm patient is taking HCTZ and Spironolactone. She has been holding Lasix.    Per Mikie Del Real, patient to resume Lasix but discontinue HCTZ. Georgina made aware and will have patient's medication box corrected. She will monitor symptoms and call with any concerns. Dori Talavera MA

## 2022-07-26 ENCOUNTER — TELEPHONE (OUTPATIENT)
Dept: CARDIOLOGY | Facility: CLINIC | Age: 77
End: 2022-07-26

## 2022-07-26 DIAGNOSIS — R60.0 BILATERAL LEG EDEMA: Primary | ICD-10-CM

## 2022-07-26 DIAGNOSIS — R06.02 SHORTNESS OF BREATH: ICD-10-CM

## 2022-07-26 DIAGNOSIS — I10 ESSENTIAL HYPERTENSION: ICD-10-CM

## 2022-07-26 NOTE — TELEPHONE ENCOUNTER
A user error has taken place: encounter opened in error, closed for administrative reasons, orders placed in error, not carried out on this patient, medication ordered in error, not dispensed to this patient, charting done on wrong patient and has been corrected.

## 2022-07-26 NOTE — ADDENDUM NOTE
Addended by: HENRY MO on: 7/26/2022 09:50 AM     Modules accepted: Orders    
(0) No drift; limb holds 90 (or 45) degrees for full 10 secs

## 2022-07-26 NOTE — TELEPHONE ENCOUNTER
Spoke with patients daughterGeorgina and she verbally understands.    BMP lab orders sent to [facility] to be drawn as requested.

## 2022-07-26 NOTE — TELEPHONE ENCOUNTER
Left message to call office. Niurka EATON    ----- Message from DANNIE Clarke sent at 7/25/2022  8:55 AM EDT -----  Can you make sure that patient stopped amlodipine?  Also, can we stop hydrochlorothiazide.  She is on spironolactone which is okay    Mikie Del Real PA [place], Melissa, LPN  Repeat a BMP in 1 week

## 2022-08-11 DIAGNOSIS — R60.0 BILATERAL LEG EDEMA: ICD-10-CM

## 2022-08-11 DIAGNOSIS — I10 ESSENTIAL HYPERTENSION: ICD-10-CM

## 2022-08-11 DIAGNOSIS — R06.02 SHORTNESS OF BREATH: ICD-10-CM

## 2022-08-12 ENCOUNTER — TELEPHONE (OUTPATIENT)
Dept: CARDIOLOGY | Facility: CLINIC | Age: 77
End: 2022-08-12

## 2022-08-12 NOTE — TELEPHONE ENCOUNTER
Spoke with patient stated she is taking Lasix and spironolactone. Instructed patient per Mikie to only taking lasix. Stop spironolactone . Patient verbalized understanding. Niurka EATON       ----- Message from DANNIE Clarke sent at 8/11/2022 12:58 PM EDT -----  Can you check with the patient on what diuretics she is taking.  I know she is on Lasix potassium where she may have been on spironolactone and hydrochlorothiazide.  Can you confirm that she is off those medicines

## 2022-08-17 ENCOUNTER — TELEPHONE (OUTPATIENT)
Dept: CARDIOLOGY | Facility: CLINIC | Age: 77
End: 2022-08-17

## 2022-08-17 NOTE — TELEPHONE ENCOUNTER
----- Message from DANNIE Clarke sent at 8/17/2022  4:05 PM EDT -----  Have them add her on in the next couple weeks (not tomorrow) at like 2 or 10am    ----- Message -----  From: Simran Jada  Sent: 8/17/2022   3:19 PM EDT  To: DANNIE Clarke, Niurka Higginbotham LPN    Pt's daughter called stating pt has to cx appt for 8/24 due to her having a mammogram scheduled. I offered them next available, which is 11/8. She stated that she thinks pt needs in sooner than that due to all of her med changes and that it was supposed to be a three month follow up. I advised her that I would send a message back to see how they want this addressed/ if they want a nurse visit due to there being no sooner appointments.     Daughter's best contact #[phone] (Georgina)

## 2022-08-18 NOTE — TELEPHONE ENCOUNTER
Patient's daughter informed of appointment this Tuesday August 23 rd @ 10:30 am. Georgina verbalized understanding. Niurka EATON

## 2022-08-23 ENCOUNTER — OFFICE VISIT (OUTPATIENT)
Dept: CARDIOLOGY | Facility: CLINIC | Age: 77
End: 2022-08-23

## 2022-08-23 VITALS
SYSTOLIC BLOOD PRESSURE: 129 MMHG | OXYGEN SATURATION: 95 % | BODY MASS INDEX: 40.65 KG/M2 | DIASTOLIC BLOOD PRESSURE: 63 MMHG | WEIGHT: 244 LBS | HEART RATE: 66 BPM | HEIGHT: 65 IN

## 2022-08-23 DIAGNOSIS — R60.0 LOWER EXTREMITY EDEMA: ICD-10-CM

## 2022-08-23 DIAGNOSIS — I10 ESSENTIAL HYPERTENSION: Primary | ICD-10-CM

## 2022-08-23 DIAGNOSIS — E78.00 HYPERCHOLESTEREMIA: ICD-10-CM

## 2022-08-23 PROCEDURE — 99213 OFFICE O/P EST LOW 20 MIN: CPT | Performed by: PHYSICIAN ASSISTANT

## 2022-08-23 RX ORDER — HYDROCHLOROTHIAZIDE 25 MG/1
25 TABLET ORAL DAILY
COMMUNITY
Start: 2022-08-14 | End: 2022-08-23 | Stop reason: ALTCHOICE

## 2022-08-23 RX ORDER — CHOLECALCIFEROL (VITAMIN D3) 125 MCG
TABLET ORAL DAILY
COMMUNITY

## 2022-08-23 NOTE — PROGRESS NOTES
Problem list     Subjective   Lorena Wolf is a 77 y.o. female     Chief Complaint   Patient presents with   • Essential hypertension        PROBLEM LIST:      1.  Nonobstructive disease/normal coronaries by catheterization in 2020  2. Shortness of breath  3. Palpitations  4. HTN  5. Hyperlipidemia  6. CVA in past  7. Colon CA, s/p resection in past  8. Anemia  9. COPD, on 02  10. DM  11. Chronic renal insuff.   12. GERD  13. Echo, 2-, mild LVH, EF 55-60%, 3D 54%, grade 2 DD, mild MR, physiolog. TR       HPI    Patient is a 77-year-old female who presents back to the office for follow-up.  Patient has done well.  Last office visit, we had adjusted her medication.  She was having some edema.  We stopped amlodipine.  We also stopped some of her other medications as it was causing renal insufficiency and we chose Lasix therapy.    She is doing much better.  She still has mild edema but has significant improvement of her lower extremity edema.    She does not complain of chest pain or pressure.  No complaints of PND or orthopnea.    Patient does not complain of palpitating or complaining of dysrhythmic symptoms.  Otherwise patient is doing well        Current Outpatient Medications on File Prior to Visit   Medication Sig Dispense Refill   • albuterol (PROVENTIL) (2.5 MG/3ML) 0.083% nebulizer solution Take 2.5 mg by nebulization 3 (Three) Times a Day.     • aspirin 81 MG EC tablet Take 81 mg by mouth Daily.     • Ergocalciferol (Vitamin D2) 50 MCG (2000 UT) tablet Take  by mouth Daily.     • escitalopram (LEXAPRO) 20 MG tablet Take 20 mg by mouth Daily.     • fluticasone (FLONASE) 50 MCG/ACT nasal spray 2 sprays into the nostril(s) as directed by provider Daily.     • furosemide (LASIX) 40 MG tablet Take 1 tablet by mouth Daily. 30 tablet 5   • levothyroxine (SYNTHROID, LEVOTHROID) 75 MCG tablet Take 75 mcg by mouth Daily.     • metoprolol tartrate (LOPRESSOR) 25 MG tablet Take 25 mg by mouth 2 (Two) Times a  Day.     • O2 (OXYGEN) Inhale 2 L/min Continuous.     • probiotic (CULTURELLE) capsule capsule Take  by mouth Daily.     • QUEtiapine (SEROquel) 25 MG tablet Take 25 mg by mouth Every Night.     • simethicone (MYLICON,GAS-X) 180 MG capsule Take 180 mg by mouth Every 6 (Six) Hours As Needed for Flatulence.     • simvastatin (ZOCOR) 10 MG tablet Take 10 mg by mouth Every Night.     • [DISCONTINUED] ciprofloxacin (Cipro) 500 MG tablet Take 1 tablet by mouth 2 (Two) Times a Day. 14 tablet 0   • [DISCONTINUED] ferrous sulfate 325 (65 FE) MG tablet Take 325 mg by mouth Daily With Breakfast.     • [DISCONTINUED] hydroCHLOROthiazide (HYDRODIURIL) 25 MG tablet Take 25 mg by mouth Daily.     • [DISCONTINUED] spironolactone (ALDACTONE) 25 MG tablet Take 1 tablet by mouth Daily. 30 tablet 11   • [DISCONTINUED] vitamin C (ASCORBIC ACID) 250 MG tablet Take 1,000 mg by mouth Daily.       No current facility-administered medications on file prior to visit.       Patient has no known allergies.    Past Medical History:   Diagnosis Date   • Chronic kidney disease     PCP reports as stage III   • Colon cancer (HCC)     s/p resection   • Constipation    • COPD (chronic obstructive pulmonary disease) (HCC)    • Depression    • Diabetes mellitus (HCC)    • GERD (gastroesophageal reflux disease)    • History of appendectomy    • History of bladder surgery    • History of eye surgery     bilateral   • History of foot surgery     bilateral   • History of hernia repair    • History of hysterectomy    • History of surgery on arm     left   • History of tubal ligation    • Hyperlipidemia    • Hypertension    • Hypothyroidism    • Osteoarthritis    • Skin cancer of nose        Social History     Socioeconomic History   • Marital status:    Tobacco Use   • Smoking status: Former Smoker     Years: 40.00     Types: Cigarettes     Quit date:      Years since quittin.6   • Smokeless tobacco: Never Used   Substance and Sexual Activity  "  • Alcohol use: Never   • Drug use: Never       Family History   Problem Relation Age of Onset   • Cancer Mother    • Stroke Mother    • COPD Father    • Heart disease Sister    • Hypertension Sister    • Hyperlipidemia Sister    • Diabetes Sister    • Cancer Sister    • COPD Brother    • Other Sister         history unknown   • Other Brother          as a child   • Heart disease Son    • Hypertension Son    • Hyperlipidemia Son    • Hyperlipidemia Daughter    • Hypertension Daughter    • Hyperlipidemia Daughter    • No Known Problems Daughter    • No Known Problems Daughter    • No Known Problems Daughter        Review of Systems   Constitutional: Negative.  Negative for chills and fever.   HENT: Negative.  Negative for congestion and sinus pressure.    Respiratory: Positive for shortness of breath (uses oxygen). Negative for chest tightness.    Cardiovascular: Positive for leg swelling. Negative for chest pain and palpitations (races, flutters at times).   Gastrointestinal: Negative.  Negative for abdominal pain, blood in stool, constipation, diarrhea, nausea and vomiting.   Endocrine: Negative.  Negative for cold intolerance and heat intolerance.   Genitourinary: Positive for frequency and urgency. Negative for dysuria and hematuria.   Musculoskeletal: Positive for gait problem (uses walker).   Neurological: Positive for dizziness (at times sitting, then stands). Negative for syncope and light-headedness.   Psychiatric/Behavioral: Positive for sleep disturbance (oxygen, denies waking with soa or cp).       Objective   Vitals:    22 1050   BP: 129/63   BP Location: Left arm   Patient Position: Sitting   Pulse: 66   SpO2: 95%   Weight: 111 kg (244 lb)   Height: 165.1 cm (65\")      /63 (BP Location: Left arm, Patient Position: Sitting)   Pulse 66   Ht 165.1 cm (65\")   Wt 111 kg (244 lb)   SpO2 95% Comment: O2 @ 2 lpm  BMI 40.60 kg/m²     Lab Results (most recent)     None          Physical " Exam  Vitals and nursing note reviewed.   Constitutional:       General: She is not in acute distress.     Appearance: Normal appearance. She is well-developed.   HENT:      Head: Normocephalic and atraumatic.   Eyes:      General: No scleral icterus.        Right eye: No discharge.         Left eye: No discharge.      Conjunctiva/sclera: Conjunctivae normal.   Neck:      Vascular: No carotid bruit.   Cardiovascular:      Rate and Rhythm: Normal rate and regular rhythm.      Heart sounds: Normal heart sounds. No murmur heard.    No friction rub. No gallop.   Pulmonary:      Effort: Pulmonary effort is normal. No respiratory distress.      Breath sounds: Normal breath sounds. No wheezing or rales.   Chest:      Chest wall: No tenderness.   Musculoskeletal:      Right lower leg: Edema present.      Left lower leg: Edema present.   Skin:     General: Skin is warm and dry.      Coloration: Skin is not pale.      Findings: No erythema or rash.   Neurological:      Mental Status: She is alert and oriented to person, place, and time.      Cranial Nerves: No cranial nerve deficit.   Psychiatric:         Behavior: Behavior normal.         Procedure   Procedures       Assessment & Plan     Problems Addressed this Visit        Cardiac and Vasculature    Hypercholesteremia    Essential hypertension - Primary       Symptoms and Signs    Lower extremity edema      Diagnoses       Codes Comments    Essential hypertension    -  Primary ICD-10-CM: I10  ICD-9-CM: 401.9     Lower extremity edema     ICD-10-CM: R60.0  ICD-9-CM: 782.3     Hypercholesteremia     ICD-10-CM: E78.00  ICD-9-CM: 272.0         Recommendation  1.  Patient is a 77-year-old female that is doing much better.  For now, we will make no changes.  With her lower extremity edema, it is mild on examination.  I did discuss that if it does not continue to improve, we can consider increasing Lasix.  I recommended calling the office if that is the case    2.  Patient with  baseline hypertension doing well on medical therapy we will make no changes at this time    3.  Patient will dyslipidemia currently on statin therapy and will continue.  For now, we will continue the same and see him back for follow-up in 6 months or sooner as symptoms discussed.  Follow-up with primary as scheduled             Lorena Wolf  reports that she quit smoking about 19 years ago. Her smoking use included cigarettes. She quit after 40.00 years of use. She has never used smokeless tobacco..    Patient brought in medicine bottles to appointment, they have been gone through with the patient. Med list was updated in the chart.     Advance Care Planning   ACP discussion was held with the patient during this visit. Patient does not have an advance directive, declines further assistance.       Electronically signed by:

## 2024-01-24 ENCOUNTER — OFFICE VISIT (OUTPATIENT)
Dept: CARDIOLOGY | Facility: CLINIC | Age: 79
End: 2024-01-24
Payer: MEDICARE

## 2024-01-24 VITALS
SYSTOLIC BLOOD PRESSURE: 157 MMHG | DIASTOLIC BLOOD PRESSURE: 65 MMHG | HEART RATE: 85 BPM | BODY MASS INDEX: 35.19 KG/M2 | OXYGEN SATURATION: 92 % | WEIGHT: 211.2 LBS | HEIGHT: 65 IN

## 2024-01-24 DIAGNOSIS — R07.89 CHEST PAIN, ATYPICAL: ICD-10-CM

## 2024-01-24 DIAGNOSIS — E08.69 DIABETES MELLITUS DUE TO UNDERLYING CONDITION WITH OTHER SPECIFIED COMPLICATION, WITHOUT LONG-TERM CURRENT USE OF INSULIN: ICD-10-CM

## 2024-01-24 DIAGNOSIS — I63.9 CEREBROVASCULAR ACCIDENT (CVA), UNSPECIFIED MECHANISM: ICD-10-CM

## 2024-01-24 DIAGNOSIS — I10 ESSENTIAL HYPERTENSION: Primary | ICD-10-CM

## 2024-01-24 DIAGNOSIS — R06.02 SHORTNESS OF BREATH: ICD-10-CM

## 2024-01-24 DIAGNOSIS — R60.0 BILATERAL LEG EDEMA: ICD-10-CM

## 2024-01-24 DIAGNOSIS — E78.00 HYPERCHOLESTEREMIA: ICD-10-CM

## 2024-01-24 RX ORDER — LANOLIN ALCOHOL/MO/W.PET/CERES
1000 CREAM (GRAM) TOPICAL DAILY
COMMUNITY

## 2024-01-24 RX ORDER — FERROUS SULFATE 325(65) MG
325 TABLET ORAL
COMMUNITY

## 2024-01-24 RX ORDER — IRBESARTAN 150 MG/1
150 TABLET ORAL NIGHTLY
COMMUNITY
End: 2024-01-24 | Stop reason: SDUPTHER

## 2024-01-24 RX ORDER — MULTIVIT-MIN/IRON/FOLIC ACID/K 18-600-40
500 CAPSULE ORAL DAILY
COMMUNITY

## 2024-01-24 RX ORDER — ALBUTEROL SULFATE 2.5 MG/3ML
2.5 SOLUTION RESPIRATORY (INHALATION) 3 TIMES DAILY
Qty: 90 EACH | Refills: 1 | Status: SHIPPED | OUTPATIENT
Start: 2024-01-24

## 2024-01-24 RX ORDER — CHOLECALCIFEROL (VITAMIN D3) 125 MCG
CAPSULE ORAL
COMMUNITY

## 2024-01-24 RX ORDER — CETIRIZINE HYDROCHLORIDE 10 MG/1
10 TABLET ORAL DAILY
COMMUNITY

## 2024-01-24 RX ORDER — IRBESARTAN 300 MG/1
300 TABLET ORAL NIGHTLY
Qty: 90 TABLET | Refills: 3 | Status: SHIPPED | OUTPATIENT
Start: 2024-01-24

## 2024-01-24 NOTE — PROGRESS NOTES
Subjective     Lorena Wolf is a 78 y.o. female who presents to day for Hypertension and Edema.    CHIEF COMPLIANT  Chief Complaint   Patient presents with    Hypertension    Edema       Active Problems:  Problem List Items Addressed This Visit          Cardiac and Vasculature    Hypercholesteremia    Relevant Medications    irbesartan (Avapro) 300 MG tablet    Other Relevant Orders    ECG 12 Lead    CBC & Differential    Comprehensive Metabolic Panel    TSH    Lipid Panel    Magnesium    Vitamin D 1,25 Dihydroxy    Hemoglobin A1c    Microalbumin / Creatinine Urine Ratio - Urine, Clean Catch    Essential hypertension - Primary    Relevant Medications    irbesartan (Avapro) 300 MG tablet    Other Relevant Orders    ECG 12 Lead    CBC & Differential    Comprehensive Metabolic Panel    TSH    Lipid Panel    Magnesium    Vitamin D 1,25 Dihydroxy    Hemoglobin A1c    Microalbumin / Creatinine Urine Ratio - Urine, Clean Catch       Neuro    CVA (cerebral vascular accident)    Relevant Medications    irbesartan (Avapro) 300 MG tablet    Other Relevant Orders    ECG 12 Lead    CBC & Differential    Comprehensive Metabolic Panel    TSH    Lipid Panel    Magnesium    Vitamin D 1,25 Dihydroxy    Hemoglobin A1c    Microalbumin / Creatinine Urine Ratio - Urine, Clean Catch       Pulmonary and Pneumonias    Shortness of breath    Relevant Medications    irbesartan (Avapro) 300 MG tablet    Other Relevant Orders    ECG 12 Lead    CBC & Differential    Comprehensive Metabolic Panel    TSH    Lipid Panel    Magnesium    Vitamin D 1,25 Dihydroxy    Hemoglobin A1c    Microalbumin / Creatinine Urine Ratio - Urine, Clean Catch       Symptoms and Signs    Chest pain, atypical    Relevant Medications    irbesartan (Avapro) 300 MG tablet    Other Relevant Orders    ECG 12 Lead    CBC & Differential    Comprehensive Metabolic Panel    TSH    Lipid Panel    Magnesium    Vitamin D 1,25 Dihydroxy    Hemoglobin A1c    Microalbumin /  Creatinine Urine Ratio - Urine, Clean Catch     Other Visit Diagnoses       Bilateral leg edema        Relevant Medications    irbesartan (Avapro) 300 MG tablet    Other Relevant Orders    ECG 12 Lead    CBC & Differential    Comprehensive Metabolic Panel    TSH    Lipid Panel    Magnesium    Vitamin D 1,25 Dihydroxy    Hemoglobin A1c    Microalbumin / Creatinine Urine Ratio - Urine, Clean Catch    Diabetes mellitus due to underlying condition with other specified complication, without long-term current use of insulin        Relevant Medications    metFORMIN (GLUCOPHAGE) 500 MG tablet    irbesartan (Avapro) 300 MG tablet    Other Relevant Orders    ECG 12 Lead    Hemoglobin A1c        PROBLEM LIST:      1.  Nonobstructive disease/normal coronaries by catheterization in 2020  2. Shortness of breath  3. Palpitations  4. HTN  5. Hyperlipidemia  6. CVA in past  7. Colon CA, s/p resection in past  8. Anemia  9. COPD, on 02  10. DM  11. Chronic renal insuff.   12. GERD  13. Echo, 2-, mild LVH, EF 55-60%, 3D 54%, grade 2 DD, mild MR, physiolog. TR    HPI  HPI  Ms. Lorena Wolf is an 78-year-old female patient who is being seen today to continue care from the cardiovascular standpoint.    Patient does have a history of chronic arterial hypertension in which she is currently being treated with irbesartan.  Blood pressure is elevated today at 157/65.  She reports that her blood pressure ranges from 130s to 170s at home.    She also reports chest tightness that is intermittent and occurs in her chest but does not occur very often.  She says when it does occur it lasts about 5 minutes and she describes as a tightness.  She says it mainly happens when she is up moving around but can occur at rest.  She does have associated shortness of breath and diaphoresis.  She is unable to identify any relieving symptoms.  She is on statin therapy of simvastatin.  And antiplatelet therapy of aspirin.  She did go under a left heart  catheterization in 2020 that showed relatively normal coronary arteries.    She also reports shortness of breath that is worse with activity but also occurs with rest.  She does have associated orthopnea.  She is on O2 therapy nasal cannula as well. Her echocardiogram in February 2022 that showed mild LVH, EF of 55 to 60%, grade 2 diastolic dysfunction, mild MR and physiological TR.  In addition that she does have a history of COPD in which she is being followed by Dr. Lori Harrison.    She also reports palpitations where she has an \intermittent racing type sensation in her chest where it feels like her heart is going fast.  This mainly occurs with activity.  She also has noticed that this occurs more frequently when her blood pressure is elevated.    She also has fatigue where she wakes up tired and does not get much better.  She says she just does not have any energy.  She says she is okay some days and other days she is more fatigued.  PRIOR MEDS  Current Outpatient Medications on File Prior to Visit   Medication Sig Dispense Refill    Ascorbic Acid (Vitamin C) 500 MG capsule Take 500 mg by mouth Daily. tablet      aspirin 81 MG EC tablet Take 1 tablet by mouth Daily.      cetirizine (zyrTEC) 10 MG tablet Take 1 tablet by mouth Daily.      Cholecalciferol (Vitamin D3) 50 MCG (2000 UT) tablet Take  by mouth.      escitalopram (LEXAPRO) 20 MG tablet Take 1 tablet by mouth Daily.      ferrous sulfate 325 (65 FE) MG tablet Take 1 tablet by mouth Daily With Breakfast.      furosemide (LASIX) 40 MG tablet Take 1 tablet by mouth Daily. 30 tablet 5    levothyroxine (SYNTHROID, LEVOTHROID) 75 MCG tablet Take 1 tablet by mouth Daily.      metFORMIN (GLUCOPHAGE) 500 MG tablet Take 1 tablet by mouth 2 (Two) Times a Day With Meals.      multivitamin with minerals (MULTIVITAMIN WOMEN PO) Take 1 tablet by mouth Daily.      O2 (OXYGEN) Inhale 2 L/min Continuous.      QUEtiapine (SEROquel) 25 MG tablet Take 1 tablet by mouth  Every Night.      simethicone (MYLICON,GAS-X) 180 MG capsule Take 1 capsule by mouth Every 6 (Six) Hours As Needed for Flatulence.      simvastatin (ZOCOR) 20 MG tablet Take 1 tablet by mouth Every Night.      vitamin B-12 (CYANOCOBALAMIN) 1000 MCG tablet Take 1 tablet by mouth Daily.       No current facility-administered medications on file prior to visit.       ALLERGIES  Patient has no known allergies.    HISTORY  Past Medical History:   Diagnosis Date    Chronic kidney disease     PCP reports as stage III    Colon cancer     s/p resection    Constipation     COPD (chronic obstructive pulmonary disease)     Depression     Diabetes mellitus     GERD (gastroesophageal reflux disease)     History of appendectomy     History of bladder surgery     History of eye surgery     bilateral    History of foot surgery     bilateral    History of hernia repair     History of hysterectomy     History of surgery on arm     left    History of tubal ligation     Hyperlipidemia     Hypertension     Hypothyroidism     Osteoarthritis     Skin cancer of nose        Social History     Socioeconomic History    Marital status:    Tobacco Use    Smoking status: Former     Years: 40     Types: Cigarettes     Quit date:      Years since quittin.0    Smokeless tobacco: Never   Substance and Sexual Activity    Alcohol use: Never    Drug use: Never       Family History   Problem Relation Age of Onset    Cancer Mother     Stroke Mother     COPD Father     Heart disease Sister     Hypertension Sister     Hyperlipidemia Sister     Diabetes Sister     Cancer Sister     COPD Brother     Other Sister         history unknown    Other Brother          as a child    Heart disease Son     Hypertension Son     Hyperlipidemia Son     Hyperlipidemia Daughter     Hypertension Daughter     Hyperlipidemia Daughter     No Known Problems Daughter     No Known Problems Daughter     No Known Problems Daughter        Review of Systems  "  Constitutional:  Positive for fatigue. Negative for chills and fever.   HENT:  Positive for congestion, rhinorrhea and sore throat.    Eyes:  Negative for visual disturbance.   Respiratory:  Positive for chest tightness (feels like there is something in there that needs to come up) and shortness of breath. Negative for apnea.    Cardiovascular:  Positive for palpitations (has hard beats and flutters sometimes). Negative for chest pain and leg swelling.   Gastrointestinal:  Positive for constipation and diarrhea. Negative for nausea.   Musculoskeletal:  Positive for arthralgias and back pain. Negative for neck pain.   Allergic/Immunologic: Positive for environmental allergies. Negative for food allergies.   Neurological:  Positive for dizziness (random), weakness (has to stop and rest sometimes) and light-headedness. Negative for syncope.   Hematological:  Bruises/bleeds easily (bruise).   Psychiatric/Behavioral:  Positive for sleep disturbance (ocasionaly she wakes up SOB).        Objective     VITALS: /65 (BP Location: Left arm, Patient Position: Sitting, Cuff Size: Adult)   Pulse 85   Ht 165.1 cm (65\")   Wt 95.8 kg (211 lb 3.2 oz)   SpO2 92%   BMI 35.15 kg/m²     LABS:   Lab Results (most recent)       None            IMAGING:   No Images in the past 120 days found..    EXAM:  Physical Exam  Vitals and nursing note reviewed.   Constitutional:       Appearance: She is well-developed.   HENT:      Head: Normocephalic.   Neck:      Thyroid: No thyroid mass.      Vascular: No carotid bruit or JVD.      Trachea: Trachea and phonation normal.   Cardiovascular:      Rate and Rhythm: Normal rate and regular rhythm.      Pulses:           Radial pulses are 2+ on the right side and 2+ on the left side.        Posterior tibial pulses are 2+ on the right side and 2+ on the left side.      Heart sounds: Murmur heard.      No friction rub. No gallop.   Pulmonary:      Effort: Pulmonary effort is normal. No " respiratory distress.      Breath sounds: Decreased air movement present. Decreased breath sounds present. No wheezing or rales.   Musculoskeletal:         General: Swelling present. Normal range of motion.      Cervical back: Neck supple.   Skin:     General: Skin is warm and dry.      Capillary Refill: Capillary refill takes less than 2 seconds.      Findings: No rash.   Neurological:      Mental Status: She is alert and oriented to person, place, and time.   Psychiatric:         Speech: Speech normal.         Behavior: Behavior normal.         Thought Content: Thought content normal.         Judgment: Judgment normal.         Procedure     ECG 12 Lead    Date/Time: 1/24/2024 11:50 AM  Performed by: Andrea Stover APRN    Authorized by: Andrea Stover APRN  Comparison: compared with previous ECG from 5/24/2022  Similar to previous ECG  Rhythm: sinus arrhythmia  Rate: normal  BPM: 68  Conduction: incomplete right bundle branch block  QRS axis: normal  Comments: QTc 412 ms  No acute changes             Assessment & Plan    Diagnosis Plan   1. Essential hypertension  ECG 12 Lead    CBC & Differential    Comprehensive Metabolic Panel    TSH    Lipid Panel    Magnesium    Vitamin D 1,25 Dihydroxy    Hemoglobin A1c    Microalbumin / Creatinine Urine Ratio - Urine, Clean Catch    irbesartan (Avapro) 300 MG tablet      2. Bilateral leg edema  ECG 12 Lead    CBC & Differential    Comprehensive Metabolic Panel    TSH    Lipid Panel    Magnesium    Vitamin D 1,25 Dihydroxy    Hemoglobin A1c    Microalbumin / Creatinine Urine Ratio - Urine, Clean Catch    irbesartan (Avapro) 300 MG tablet      3. Shortness of breath  ECG 12 Lead    CBC & Differential    Comprehensive Metabolic Panel    TSH    Lipid Panel    Magnesium    Vitamin D 1,25 Dihydroxy    Hemoglobin A1c    Microalbumin / Creatinine Urine Ratio - Urine, Clean Catch    irbesartan (Avapro) 300 MG tablet      4. Hypercholesteremia  ECG 12 Lead    CBC & Differential     Comprehensive Metabolic Panel    TSH    Lipid Panel    Magnesium    Vitamin D 1,25 Dihydroxy    Hemoglobin A1c    Microalbumin / Creatinine Urine Ratio - Urine, Clean Catch    irbesartan (Avapro) 300 MG tablet      5. Chest pain, atypical  ECG 12 Lead    CBC & Differential    Comprehensive Metabolic Panel    TSH    Lipid Panel    Magnesium    Vitamin D 1,25 Dihydroxy    Hemoglobin A1c    Microalbumin / Creatinine Urine Ratio - Urine, Clean Catch    irbesartan (Avapro) 300 MG tablet      6. Cerebrovascular accident (CVA), unspecified mechanism  ECG 12 Lead    CBC & Differential    Comprehensive Metabolic Panel    TSH    Lipid Panel    Magnesium    Vitamin D 1,25 Dihydroxy    Hemoglobin A1c    Microalbumin / Creatinine Urine Ratio - Urine, Clean Catch    irbesartan (Avapro) 300 MG tablet      7. Diabetes mellitus due to underlying condition with other specified complication, without long-term current use of insulin  ECG 12 Lead    Hemoglobin A1c    irbesartan (Avapro) 300 MG tablet      1.  Due to patient's chest tightness shortness of breath we did discuss ischemic evaluation including stress test echocardiogram.  However she feels like most of her symptoms are more associated with her lungs as she has a soon follow-up with Dr. Lori Harrison.  He would like to be evaluated from pulmonary before moving forth with repeat testing.  We did do a left heart catheterization in 2020 that showed relatively normal coronary arteries.  She does have grade 2 diastolic dysfunction which could contribute some to her shortness of breath.  She is on diuretic therapy of Lasix.  2.  Patient's blood pressure is elevated today at 157/65.  She is on irbesartan 150 mg daily.  Will increase this to 300 mg daily she will monitor her blood pressure at home and report any significant highs or lows.  Goal blood pressure is 130s over 80s.  3.  I also like to get an extensive laboratory workup to evaluate patient including CBC, CMP, TSH,  lipid panel, magnesium, vitamin D, hemoglobin A1c, and microalbumin.  4.  Informed of signs and symptoms of ACS and advised to seek emergent treatment for any new worsening symptoms.  Patient also advised sooner follow-up as needed.  Also advised to follow-up with family doctor as needed  This note is dictated utilizing voice recognition software.  Although this record has been proof read, transcriptional errors may still be present. If questions occur regarding the content of this record please do not hesitate to call our office.  I have reviewed and confirmed the accuracy of the ROS as documented by the MA/LPN/RN ARVIND eRed    No follow-ups on file.    Diagnoses and all orders for this visit:    1. Essential hypertension (Primary)  -     ECG 12 Lead  -     CBC & Differential; Future  -     Comprehensive Metabolic Panel; Future  -     TSH; Future  -     Lipid Panel; Future  -     Magnesium; Future  -     Vitamin D 1,25 Dihydroxy; Future  -     Hemoglobin A1c; Future  -     Microalbumin / Creatinine Urine Ratio - Urine, Clean Catch; Future  -     irbesartan (Avapro) 300 MG tablet; Take 1 tablet by mouth Every Night.  Dispense: 90 tablet; Refill: 3    2. Bilateral leg edema  -     ECG 12 Lead  -     CBC & Differential; Future  -     Comprehensive Metabolic Panel; Future  -     TSH; Future  -     Lipid Panel; Future  -     Magnesium; Future  -     Vitamin D 1,25 Dihydroxy; Future  -     Hemoglobin A1c; Future  -     Microalbumin / Creatinine Urine Ratio - Urine, Clean Catch; Future  -     irbesartan (Avapro) 300 MG tablet; Take 1 tablet by mouth Every Night.  Dispense: 90 tablet; Refill: 3    3. Shortness of breath  -     ECG 12 Lead  -     CBC & Differential; Future  -     Comprehensive Metabolic Panel; Future  -     TSH; Future  -     Lipid Panel; Future  -     Magnesium; Future  -     Vitamin D 1,25 Dihydroxy; Future  -     Hemoglobin A1c; Future  -     Microalbumin / Creatinine Urine Ratio - Urine, Clean  Catch; Future  -     irbesartan (Avapro) 300 MG tablet; Take 1 tablet by mouth Every Night.  Dispense: 90 tablet; Refill: 3    4. Hypercholesteremia  -     ECG 12 Lead  -     CBC & Differential; Future  -     Comprehensive Metabolic Panel; Future  -     TSH; Future  -     Lipid Panel; Future  -     Magnesium; Future  -     Vitamin D 1,25 Dihydroxy; Future  -     Hemoglobin A1c; Future  -     Microalbumin / Creatinine Urine Ratio - Urine, Clean Catch; Future  -     irbesartan (Avapro) 300 MG tablet; Take 1 tablet by mouth Every Night.  Dispense: 90 tablet; Refill: 3    5. Chest pain, atypical  -     ECG 12 Lead  -     CBC & Differential; Future  -     Comprehensive Metabolic Panel; Future  -     TSH; Future  -     Lipid Panel; Future  -     Magnesium; Future  -     Vitamin D 1,25 Dihydroxy; Future  -     Hemoglobin A1c; Future  -     Microalbumin / Creatinine Urine Ratio - Urine, Clean Catch; Future  -     irbesartan (Avapro) 300 MG tablet; Take 1 tablet by mouth Every Night.  Dispense: 90 tablet; Refill: 3    6. Cerebrovascular accident (CVA), unspecified mechanism  -     ECG 12 Lead  -     CBC & Differential; Future  -     Comprehensive Metabolic Panel; Future  -     TSH; Future  -     Lipid Panel; Future  -     Magnesium; Future  -     Vitamin D 1,25 Dihydroxy; Future  -     Hemoglobin A1c; Future  -     Microalbumin / Creatinine Urine Ratio - Urine, Clean Catch; Future  -     irbesartan (Avapro) 300 MG tablet; Take 1 tablet by mouth Every Night.  Dispense: 90 tablet; Refill: 3    7. Diabetes mellitus due to underlying condition with other specified complication, without long-term current use of insulin  -     ECG 12 Lead  -     Hemoglobin A1c; Future  -     irbesartan (Avapro) 300 MG tablet; Take 1 tablet by mouth Every Night.  Dispense: 90 tablet; Refill: 3    Other orders  -     albuterol (PROVENTIL) (2.5 MG/3ML) 0.083% nebulizer solution; Take 2.5 mg by nebulization 3 (Three) Times a Day.  Dispense: 90 each;  Refill: 1        Lorena Wolf  reports that she quit smoking about 21 years ago. Her smoking use included cigarettes. She has never used smokeless tobacco.. I have educated her on the risk of diseases from using tobacco products .    Advance Care Planning   ACP discussion was held with the patient during this visit. Patient does not have an advance directive, information provided.        MEDS ORDERED DURING VISIT:  New Medications Ordered This Visit   Medications    albuterol (PROVENTIL) (2.5 MG/3ML) 0.083% nebulizer solution     Sig: Take 2.5 mg by nebulization 3 (Three) Times a Day.     Dispense:  90 each     Refill:  1    irbesartan (Avapro) 300 MG tablet     Sig: Take 1 tablet by mouth Every Night.     Dispense:  90 tablet     Refill:  3           This document has been electronically signed by Andrea Stover Jr., APRN  January 25, 2024 08:17 EST

## 2024-01-24 NOTE — LETTER
January 25, 2024       No Recipients    Patient: Lorena Wolf   YOB: 1945   Date of Visit: 1/24/2024     Dear Deyanira Harrison MD:       Thank you for referring Lorena Wolf to me for evaluation. Below are the relevant portions of my assessment and plan of care.    If you have questions, please do not hesitate to call me. I look forward to following Lorena along with you.         Sincerely,        ARVIND Reed        CC:   No Recipients    Andrea Stover APRN  01/25/24 0821  Sign when Signing Visit  Subjective    Lorena Wolf is a 78 y.o. female who presents to day for Hypertension and Edema.    CHIEF COMPLIANT  Chief Complaint   Patient presents with   • Hypertension   • Edema       Active Problems:  Problem List Items Addressed This Visit          Cardiac and Vasculature    Hypercholesteremia    Relevant Medications    irbesartan (Avapro) 300 MG tablet    Other Relevant Orders    ECG 12 Lead    CBC & Differential    Comprehensive Metabolic Panel    TSH    Lipid Panel    Magnesium    Vitamin D 1,25 Dihydroxy    Hemoglobin A1c    Microalbumin / Creatinine Urine Ratio - Urine, Clean Catch    Essential hypertension - Primary    Relevant Medications    irbesartan (Avapro) 300 MG tablet    Other Relevant Orders    ECG 12 Lead    CBC & Differential    Comprehensive Metabolic Panel    TSH    Lipid Panel    Magnesium    Vitamin D 1,25 Dihydroxy    Hemoglobin A1c    Microalbumin / Creatinine Urine Ratio - Urine, Clean Catch       Neuro    CVA (cerebral vascular accident)    Relevant Medications    irbesartan (Avapro) 300 MG tablet    Other Relevant Orders    ECG 12 Lead    CBC & Differential    Comprehensive Metabolic Panel    TSH    Lipid Panel    Magnesium    Vitamin D 1,25 Dihydroxy    Hemoglobin A1c    Microalbumin / Creatinine Urine Ratio - Urine, Clean Catch       Pulmonary and Pneumonias    Shortness of breath    Relevant Medications    irbesartan (Avapro) 300 MG tablet    Other  Relevant Orders    ECG 12 Lead    CBC & Differential    Comprehensive Metabolic Panel    TSH    Lipid Panel    Magnesium    Vitamin D 1,25 Dihydroxy    Hemoglobin A1c    Microalbumin / Creatinine Urine Ratio - Urine, Clean Catch       Symptoms and Signs    Chest pain, atypical    Relevant Medications    irbesartan (Avapro) 300 MG tablet    Other Relevant Orders    ECG 12 Lead    CBC & Differential    Comprehensive Metabolic Panel    TSH    Lipid Panel    Magnesium    Vitamin D 1,25 Dihydroxy    Hemoglobin A1c    Microalbumin / Creatinine Urine Ratio - Urine, Clean Catch     Other Visit Diagnoses       Bilateral leg edema        Relevant Medications    irbesartan (Avapro) 300 MG tablet    Other Relevant Orders    ECG 12 Lead    CBC & Differential    Comprehensive Metabolic Panel    TSH    Lipid Panel    Magnesium    Vitamin D 1,25 Dihydroxy    Hemoglobin A1c    Microalbumin / Creatinine Urine Ratio - Urine, Clean Catch    Diabetes mellitus due to underlying condition with other specified complication, without long-term current use of insulin        Relevant Medications    metFORMIN (GLUCOPHAGE) 500 MG tablet    irbesartan (Avapro) 300 MG tablet    Other Relevant Orders    ECG 12 Lead    Hemoglobin A1c        PROBLEM LIST:      1.  Nonobstructive disease/normal coronaries by catheterization in 2020  2. Shortness of breath  3. Palpitations  4. HTN  5. Hyperlipidemia  6. CVA in past  7. Colon CA, s/p resection in past  8. Anemia  9. COPD, on 02  10. DM  11. Chronic renal insuff.   12. GERD  13. Echo, 2-, mild LVH, EF 55-60%, 3D 54%, grade 2 DD, mild MR, physiolog. TR    HPI  HPI  Ms. Lorena Wolf is an 78-year-old female patient who is being seen today to continue care from the cardiovascular standpoint.    Patient does have a history of chronic arterial hypertension in which she is currently being treated with irbesartan.  Blood pressure is elevated today at 157/65.  She reports that her blood pressure ranges  from 130s to 170s at home.    She also reports chest tightness that is intermittent and occurs in her chest but does not occur very often.  She says when it does occur it lasts about 5 minutes and she describes as a tightness.  She says it mainly happens when she is up moving around but can occur at rest.  She does have associated shortness of breath and diaphoresis.  She is unable to identify any relieving symptoms.  She is on statin therapy of simvastatin.  And antiplatelet therapy of aspirin.  She did go under a left heart catheterization in 2020 that showed relatively normal coronary arteries.    She also reports shortness of breath that is worse with activity but also occurs with rest.  She does have associated orthopnea.  She is on O2 therapy nasal cannula as well. Her echocardiogram in February 2022 that showed mild LVH, EF of 55 to 60%, grade 2 diastolic dysfunction, mild MR and physiological TR.  In addition that she does have a history of COPD in which she is being followed by Dr. Lori Harrison.    She also reports palpitations where she has an \intermittent racing type sensation in her chest where it feels like her heart is going fast.  This mainly occurs with activity.  She also has noticed that this occurs more frequently when her blood pressure is elevated.    She also has fatigue where she wakes up tired and does not get much better.  She says she just does not have any energy.  She says she is okay some days and other days she is more fatigued.  PRIOR MEDS  Current Outpatient Medications on File Prior to Visit   Medication Sig Dispense Refill   • Ascorbic Acid (Vitamin C) 500 MG capsule Take 500 mg by mouth Daily. tablet     • aspirin 81 MG EC tablet Take 1 tablet by mouth Daily.     • cetirizine (zyrTEC) 10 MG tablet Take 1 tablet by mouth Daily.     • Cholecalciferol (Vitamin D3) 50 MCG (2000 UT) tablet Take  by mouth.     • escitalopram (LEXAPRO) 20 MG tablet Take 1 tablet by mouth Daily.      • ferrous sulfate 325 (65 FE) MG tablet Take 1 tablet by mouth Daily With Breakfast.     • furosemide (LASIX) 40 MG tablet Take 1 tablet by mouth Daily. 30 tablet 5   • levothyroxine (SYNTHROID, LEVOTHROID) 75 MCG tablet Take 1 tablet by mouth Daily.     • metFORMIN (GLUCOPHAGE) 500 MG tablet Take 1 tablet by mouth 2 (Two) Times a Day With Meals.     • multivitamin with minerals (MULTIVITAMIN WOMEN PO) Take 1 tablet by mouth Daily.     • O2 (OXYGEN) Inhale 2 L/min Continuous.     • QUEtiapine (SEROquel) 25 MG tablet Take 1 tablet by mouth Every Night.     • simethicone (MYLICON,GAS-X) 180 MG capsule Take 1 capsule by mouth Every 6 (Six) Hours As Needed for Flatulence.     • simvastatin (ZOCOR) 20 MG tablet Take 1 tablet by mouth Every Night.     • vitamin B-12 (CYANOCOBALAMIN) 1000 MCG tablet Take 1 tablet by mouth Daily.       No current facility-administered medications on file prior to visit.       ALLERGIES  Patient has no known allergies.    HISTORY  Past Medical History:   Diagnosis Date   • Chronic kidney disease     PCP reports as stage III   • Colon cancer     s/p resection   • Constipation    • COPD (chronic obstructive pulmonary disease)    • Depression    • Diabetes mellitus    • GERD (gastroesophageal reflux disease)    • History of appendectomy    • History of bladder surgery    • History of eye surgery     bilateral   • History of foot surgery     bilateral   • History of hernia repair    • History of hysterectomy    • History of surgery on arm     left   • History of tubal ligation    • Hyperlipidemia    • Hypertension    • Hypothyroidism    • Osteoarthritis    • Skin cancer of nose        Social History     Socioeconomic History   • Marital status:    Tobacco Use   • Smoking status: Former     Years: 40     Types: Cigarettes     Quit date:      Years since quittin.0   • Smokeless tobacco: Never   Substance and Sexual Activity   • Alcohol use: Never   • Drug use: Never       Family  "History   Problem Relation Age of Onset   • Cancer Mother    • Stroke Mother    • COPD Father    • Heart disease Sister    • Hypertension Sister    • Hyperlipidemia Sister    • Diabetes Sister    • Cancer Sister    • COPD Brother    • Other Sister         history unknown   • Other Brother          as a child   • Heart disease Son    • Hypertension Son    • Hyperlipidemia Son    • Hyperlipidemia Daughter    • Hypertension Daughter    • Hyperlipidemia Daughter    • No Known Problems Daughter    • No Known Problems Daughter    • No Known Problems Daughter        Review of Systems   Constitutional:  Positive for fatigue. Negative for chills and fever.   HENT:  Positive for congestion, rhinorrhea and sore throat.    Eyes:  Negative for visual disturbance.   Respiratory:  Positive for chest tightness (feels like there is something in there that needs to come up) and shortness of breath. Negative for apnea.    Cardiovascular:  Positive for palpitations (has hard beats and flutters sometimes). Negative for chest pain and leg swelling.   Gastrointestinal:  Positive for constipation and diarrhea. Negative for nausea.   Musculoskeletal:  Positive for arthralgias and back pain. Negative for neck pain.   Allergic/Immunologic: Positive for environmental allergies. Negative for food allergies.   Neurological:  Positive for dizziness (random), weakness (has to stop and rest sometimes) and light-headedness. Negative for syncope.   Hematological:  Bruises/bleeds easily (bruise).   Psychiatric/Behavioral:  Positive for sleep disturbance (ocasionaly she wakes up SOB).        Objective    VITALS: /65 (BP Location: Left arm, Patient Position: Sitting, Cuff Size: Adult)   Pulse 85   Ht 165.1 cm (65\")   Wt 95.8 kg (211 lb 3.2 oz)   SpO2 92%   BMI 35.15 kg/m²     LABS:   Lab Results (most recent)       None            IMAGING:   No Images in the past 120 days found..    EXAM:  Physical Exam  Vitals and nursing note reviewed. "   Constitutional:       Appearance: She is well-developed.   HENT:      Head: Normocephalic.   Neck:      Thyroid: No thyroid mass.      Vascular: No carotid bruit or JVD.      Trachea: Trachea and phonation normal.   Cardiovascular:      Rate and Rhythm: Normal rate and regular rhythm.      Pulses:           Radial pulses are 2+ on the right side and 2+ on the left side.        Posterior tibial pulses are 2+ on the right side and 2+ on the left side.      Heart sounds: Murmur heard.      No friction rub. No gallop.   Pulmonary:      Effort: Pulmonary effort is normal. No respiratory distress.      Breath sounds: Decreased air movement present. Decreased breath sounds present. No wheezing or rales.   Musculoskeletal:         General: Swelling present. Normal range of motion.      Cervical back: Neck supple.   Skin:     General: Skin is warm and dry.      Capillary Refill: Capillary refill takes less than 2 seconds.      Findings: No rash.   Neurological:      Mental Status: She is alert and oriented to person, place, and time.   Psychiatric:         Speech: Speech normal.         Behavior: Behavior normal.         Thought Content: Thought content normal.         Judgment: Judgment normal.         Procedure    ECG 12 Lead    Date/Time: 1/24/2024 11:50 AM  Performed by: Andrea Stover APRN    Authorized by: Andrea Stover APRN  Comparison: compared with previous ECG from 5/24/2022  Similar to previous ECG  Rhythm: sinus arrhythmia  Rate: normal  BPM: 68  Conduction: incomplete right bundle branch block  QRS axis: normal  Comments: QTc 412 ms  No acute changes             Assessment & Plan   Diagnosis Plan   1. Essential hypertension  ECG 12 Lead    CBC & Differential    Comprehensive Metabolic Panel    TSH    Lipid Panel    Magnesium    Vitamin D 1,25 Dihydroxy    Hemoglobin A1c    Microalbumin / Creatinine Urine Ratio - Urine, Clean Catch    irbesartan (Avapro) 300 MG tablet      2. Bilateral leg edema  ECG 12  Lead    CBC & Differential    Comprehensive Metabolic Panel    TSH    Lipid Panel    Magnesium    Vitamin D 1,25 Dihydroxy    Hemoglobin A1c    Microalbumin / Creatinine Urine Ratio - Urine, Clean Catch    irbesartan (Avapro) 300 MG tablet      3. Shortness of breath  ECG 12 Lead    CBC & Differential    Comprehensive Metabolic Panel    TSH    Lipid Panel    Magnesium    Vitamin D 1,25 Dihydroxy    Hemoglobin A1c    Microalbumin / Creatinine Urine Ratio - Urine, Clean Catch    irbesartan (Avapro) 300 MG tablet      4. Hypercholesteremia  ECG 12 Lead    CBC & Differential    Comprehensive Metabolic Panel    TSH    Lipid Panel    Magnesium    Vitamin D 1,25 Dihydroxy    Hemoglobin A1c    Microalbumin / Creatinine Urine Ratio - Urine, Clean Catch    irbesartan (Avapro) 300 MG tablet      5. Chest pain, atypical  ECG 12 Lead    CBC & Differential    Comprehensive Metabolic Panel    TSH    Lipid Panel    Magnesium    Vitamin D 1,25 Dihydroxy    Hemoglobin A1c    Microalbumin / Creatinine Urine Ratio - Urine, Clean Catch    irbesartan (Avapro) 300 MG tablet      6. Cerebrovascular accident (CVA), unspecified mechanism  ECG 12 Lead    CBC & Differential    Comprehensive Metabolic Panel    TSH    Lipid Panel    Magnesium    Vitamin D 1,25 Dihydroxy    Hemoglobin A1c    Microalbumin / Creatinine Urine Ratio - Urine, Clean Catch    irbesartan (Avapro) 300 MG tablet      7. Diabetes mellitus due to underlying condition with other specified complication, without long-term current use of insulin  ECG 12 Lead    Hemoglobin A1c    irbesartan (Avapro) 300 MG tablet      1.  Due to patient's chest tightness shortness of breath we did discuss ischemic evaluation including stress test echocardiogram.  However she feels like most of her symptoms are more associated with her lungs as she has a soon follow-up with Dr. Lori Harrison.  He would like to be evaluated from pulmonary before moving forth with repeat testing.  We did do a  left heart catheterization in 2020 that showed relatively normal coronary arteries.  She does have grade 2 diastolic dysfunction which could contribute some to her shortness of breath.  She is on diuretic therapy of Lasix.  2.  Patient's blood pressure is elevated today at 157/65.  She is on irbesartan 150 mg daily.  Will increase this to 300 mg daily she will monitor her blood pressure at home and report any significant highs or lows.  Goal blood pressure is 130s over 80s.  3.  I also like to get an extensive laboratory workup to evaluate patient including CBC, CMP, TSH, lipid panel, magnesium, vitamin D, hemoglobin A1c, and microalbumin.  4.  Informed of signs and symptoms of ACS and advised to seek emergent treatment for any new worsening symptoms.  Patient also advised sooner follow-up as needed.  Also advised to follow-up with family doctor as needed  This note is dictated utilizing voice recognition software.  Although this record has been proof read, transcriptional errors may still be present. If questions occur regarding the content of this record please do not hesitate to call our office.  I have reviewed and confirmed the accuracy of the ROS as documented by the MA/LPN/RN ARVIND Reed    No follow-ups on file.    Diagnoses and all orders for this visit:    1. Essential hypertension (Primary)  -     ECG 12 Lead  -     CBC & Differential; Future  -     Comprehensive Metabolic Panel; Future  -     TSH; Future  -     Lipid Panel; Future  -     Magnesium; Future  -     Vitamin D 1,25 Dihydroxy; Future  -     Hemoglobin A1c; Future  -     Microalbumin / Creatinine Urine Ratio - Urine, Clean Catch; Future  -     irbesartan (Avapro) 300 MG tablet; Take 1 tablet by mouth Every Night.  Dispense: 90 tablet; Refill: 3    2. Bilateral leg edema  -     ECG 12 Lead  -     CBC & Differential; Future  -     Comprehensive Metabolic Panel; Future  -     TSH; Future  -     Lipid Panel; Future  -     Magnesium;  Future  -     Vitamin D 1,25 Dihydroxy; Future  -     Hemoglobin A1c; Future  -     Microalbumin / Creatinine Urine Ratio - Urine, Clean Catch; Future  -     irbesartan (Avapro) 300 MG tablet; Take 1 tablet by mouth Every Night.  Dispense: 90 tablet; Refill: 3    3. Shortness of breath  -     ECG 12 Lead  -     CBC & Differential; Future  -     Comprehensive Metabolic Panel; Future  -     TSH; Future  -     Lipid Panel; Future  -     Magnesium; Future  -     Vitamin D 1,25 Dihydroxy; Future  -     Hemoglobin A1c; Future  -     Microalbumin / Creatinine Urine Ratio - Urine, Clean Catch; Future  -     irbesartan (Avapro) 300 MG tablet; Take 1 tablet by mouth Every Night.  Dispense: 90 tablet; Refill: 3    4. Hypercholesteremia  -     ECG 12 Lead  -     CBC & Differential; Future  -     Comprehensive Metabolic Panel; Future  -     TSH; Future  -     Lipid Panel; Future  -     Magnesium; Future  -     Vitamin D 1,25 Dihydroxy; Future  -     Hemoglobin A1c; Future  -     Microalbumin / Creatinine Urine Ratio - Urine, Clean Catch; Future  -     irbesartan (Avapro) 300 MG tablet; Take 1 tablet by mouth Every Night.  Dispense: 90 tablet; Refill: 3    5. Chest pain, atypical  -     ECG 12 Lead  -     CBC & Differential; Future  -     Comprehensive Metabolic Panel; Future  -     TSH; Future  -     Lipid Panel; Future  -     Magnesium; Future  -     Vitamin D 1,25 Dihydroxy; Future  -     Hemoglobin A1c; Future  -     Microalbumin / Creatinine Urine Ratio - Urine, Clean Catch; Future  -     irbesartan (Avapro) 300 MG tablet; Take 1 tablet by mouth Every Night.  Dispense: 90 tablet; Refill: 3    6. Cerebrovascular accident (CVA), unspecified mechanism  -     ECG 12 Lead  -     CBC & Differential; Future  -     Comprehensive Metabolic Panel; Future  -     TSH; Future  -     Lipid Panel; Future  -     Magnesium; Future  -     Vitamin D 1,25 Dihydroxy; Future  -     Hemoglobin A1c; Future  -     Microalbumin / Creatinine Urine  Ratio - Urine, Clean Catch; Future  -     irbesartan (Avapro) 300 MG tablet; Take 1 tablet by mouth Every Night.  Dispense: 90 tablet; Refill: 3    7. Diabetes mellitus due to underlying condition with other specified complication, without long-term current use of insulin  -     ECG 12 Lead  -     Hemoglobin A1c; Future  -     irbesartan (Avapro) 300 MG tablet; Take 1 tablet by mouth Every Night.  Dispense: 90 tablet; Refill: 3    Other orders  -     albuterol (PROVENTIL) (2.5 MG/3ML) 0.083% nebulizer solution; Take 2.5 mg by nebulization 3 (Three) Times a Day.  Dispense: 90 each; Refill: 1        Lorena Wolf  reports that she quit smoking about 21 years ago. Her smoking use included cigarettes. She has never used smokeless tobacco.. I have educated her on the risk of diseases from using tobacco products .    Advance Care Planning  ACP discussion was held with the patient during this visit. Patient does not have an advance directive, information provided.        MEDS ORDERED DURING VISIT:  New Medications Ordered This Visit   Medications   • albuterol (PROVENTIL) (2.5 MG/3ML) 0.083% nebulizer solution     Sig: Take 2.5 mg by nebulization 3 (Three) Times a Day.     Dispense:  90 each     Refill:  1   • irbesartan (Avapro) 300 MG tablet     Sig: Take 1 tablet by mouth Every Night.     Dispense:  90 tablet     Refill:  3           This document has been electronically signed by Andrea Stover Jr., APRN  January 25, 2024 08:17 EST

## 2024-02-28 ENCOUNTER — TELEPHONE (OUTPATIENT)
Dept: CARDIOLOGY | Facility: CLINIC | Age: 79
End: 2024-02-28
Payer: MEDICARE

## 2024-02-28 NOTE — TELEPHONE ENCOUNTER
"Relay     \"Called to verify if the pt had the labs ordered by JR Stover performed.  Pt's number is not in services.  LVM at pt's emergency contact who is on her HIPAA.  If pt had labs performed, please find out where.  If not, inquire if we need to fax lab requisitions to her PCP or if she wants to come downstairs to have them performed.  Pt needs to be fasting\"        "